# Patient Record
Sex: FEMALE | Race: BLACK OR AFRICAN AMERICAN | URBAN - METROPOLITAN AREA
[De-identification: names, ages, dates, MRNs, and addresses within clinical notes are randomized per-mention and may not be internally consistent; named-entity substitution may affect disease eponyms.]

---

## 2024-09-19 ENCOUNTER — NURSE TRIAGE (OUTPATIENT)
Dept: NURSING | Facility: CLINIC | Age: 24
End: 2024-09-19

## 2024-09-19 NOTE — TELEPHONE ENCOUNTER
"Patient calling reports recently finding out she's pregnant with nausea and vomiting causing some abdominal cramping. Reports vomiting started yesterday and has been mild. Patient also reports possibly having Covid and wants home care advice. Advised medications in early pregnancy are typically not recommended, to maintain hydration and follow up with her PCP tomorrow if she is unable to keep fluids down. Denies vomiting blood, severe vomiting and dehydration.     Nuzhat Self RN 09/19/24 5:38 PM    Health Triage Nurse Advisor    Reason for Disposition   MILD-MODERATE vomiting (e.g., 1-5 times / day) or nausea    Additional Information   Negative: [1] Vomiting AND [2] contains red blood or black (\"coffee ground\") material  (Exception: Few red streaks in vomit that only happened once.)   Negative: [1] Insulin-dependent diabetes (Type I) AND [2] glucose > 400 mg/dl (22 mmol/l)   Negative: Recent head injury (within last 3 days)   Negative: Recent abdominal injury (within last 3 days)   Negative: Severe pain in one eye   Negative: [1] SEVERE vomiting (e.g., 6 or more times / day) AND [2] present > 8 hours   Negative: [1] Drinking very little AND [2] dehydration suspected (e.g., no urine > 12 hours, very dry mouth, very lightheaded)   Negative: Patient sounds very sick or weak to the triager   Negative: [1] Unable to keep ANY liquids down (without vomiting) AND [2] present > 24 hours   Negative: Weight loss > 5 pounds (2.5 kg) in last 2 weeks   Negative: Vomiting an essential or prescribed medication  (Exception: Taking prenatal vitamins.)   Negative: Recently started on a new medication   Negative: [1] MODERATE vomiting (e.g., 3 - 5 times / day) AND [2] present > 3 days   Negative: Pain or burning with passing urine (urination)   Negative: Substance use (drug use) or unhealthy alcohol use, known or suspected   Negative: High-risk adult (e.g., diabetes mellitus, AIDS/HIV)   Negative: [1] MILD vomiting (e.g., 1 - 2 " times / day) AND [2] present > 3 days AND [3] started after the 9th week of pregnancy   Negative: [1] MILD vomiting AND [2] present > 1 week AND [3] no improvement after using Morning Sickness Care Advice    Protocols used: Pregnancy - Morning Sickness (Nausea and Vomiting of Pregnancy)-A-

## 2025-01-21 ENCOUNTER — TRANSFERRED RECORDS (OUTPATIENT)
Dept: HEALTH INFORMATION MANAGEMENT | Facility: CLINIC | Age: 25
End: 2025-01-21

## 2025-04-02 ENCOUNTER — HOSPITAL ENCOUNTER (INPATIENT)
Facility: CLINIC | Age: 25
End: 2025-04-02
Attending: OBSTETRICS & GYNECOLOGY | Admitting: OBSTETRICS & GYNECOLOGY

## 2025-04-02 ENCOUNTER — TRANSFERRED RECORDS (OUTPATIENT)
Dept: OBGYN | Facility: CLINIC | Age: 25
End: 2025-04-02

## 2025-04-02 DIAGNOSIS — D64.9 ACUTE ON CHRONIC ANEMIA: ICD-10-CM

## 2025-04-02 PROBLEM — O09.90 SUPERVISION OF HIGH-RISK PREGNANCY: Status: ACTIVE | Noted: 2025-04-02

## 2025-04-02 LAB
ABO + RH BLD: NORMAL
ALBUMIN UR-MCNC: NEGATIVE MG/DL
APPEARANCE UR: CLEAR
BASOPHILS # BLD AUTO: 0 10E3/UL (ref 0–0.2)
BASOPHILS NFR BLD AUTO: 0 %
BILIRUB UR QL STRIP: NEGATIVE
BLD GP AB SCN SERPL QL: NEGATIVE
BLD PROD TYP BPU: NORMAL
BLD PROD TYP BPU: NORMAL
BLOOD COMPONENT TYPE: NORMAL
BLOOD COMPONENT TYPE: NORMAL
C TRACH DNA SPEC QL PROBE+SIG AMP: NEGATIVE
CLUE CELLS: NORMAL
CODING SYSTEM: NORMAL
CODING SYSTEM: NORMAL
COLOR UR AUTO: ABNORMAL
CROSSMATCH: NORMAL
CROSSMATCH: NORMAL
EOSINOPHIL # BLD AUTO: 0 10E3/UL (ref 0–0.7)
EOSINOPHIL NFR BLD AUTO: 0 %
ERYTHROCYTE [DISTWIDTH] IN BLOOD BY AUTOMATED COUNT: 15.2 % (ref 10–15)
GLUCOSE UR STRIP-MCNC: NEGATIVE MG/DL
HCT VFR BLD AUTO: 26.4 % (ref 35–47)
HGB BLD-MCNC: 8.1 G/DL (ref 11.7–15.7)
HGB UR QL STRIP: NEGATIVE
HOLD SPECIMEN: NORMAL
IMM GRANULOCYTES # BLD: 0.1 10E3/UL
IMM GRANULOCYTES NFR BLD: 1 %
ISSUE DATE AND TIME: NORMAL
ISSUE DATE AND TIME: NORMAL
KETONES UR STRIP-MCNC: NEGATIVE MG/DL
LEUKOCYTE ESTERASE UR QL STRIP: NEGATIVE
LYMPHOCYTES # BLD AUTO: 1 10E3/UL (ref 0.8–5.3)
LYMPHOCYTES NFR BLD AUTO: 12 %
MCH RBC QN AUTO: 23.1 PG (ref 26.5–33)
MCHC RBC AUTO-ENTMCNC: 30.7 G/DL (ref 31.5–36.5)
MCV RBC AUTO: 75 FL (ref 78–100)
MONOCYTES # BLD AUTO: 0.2 10E3/UL (ref 0–1.3)
MONOCYTES NFR BLD AUTO: 3 %
MUCOUS THREADS #/AREA URNS LPF: PRESENT /LPF
N GONORRHOEA DNA SPEC QL NAA+PROBE: NEGATIVE
NEUTROPHILS # BLD AUTO: 7.4 10E3/UL (ref 1.6–8.3)
NEUTROPHILS NFR BLD AUTO: 85 %
NITRATE UR QL: NEGATIVE
NRBC # BLD AUTO: 0 10E3/UL
NRBC BLD AUTO-RTO: 0 /100
PH UR STRIP: 6.5 [PH] (ref 5–7)
PLATELET # BLD AUTO: 159 10E3/UL (ref 150–450)
RBC # BLD AUTO: 3.51 10E6/UL (ref 3.8–5.2)
RBC URINE: 0 /HPF
SP GR UR STRIP: 1.01 (ref 1–1.03)
SPECIMEN EXP DATE BLD: NORMAL
SPECIMEN TYPE: NORMAL
SQUAMOUS EPITHELIAL: 2 /HPF
TRICHOMONAS, WET PREP: NORMAL
UNIT ABO/RH: NORMAL
UNIT ABO/RH: NORMAL
UNIT NUMBER: NORMAL
UNIT NUMBER: NORMAL
UNIT STATUS: NORMAL
UNIT STATUS: NORMAL
UNIT TYPE ISBT: 6200
UNIT TYPE ISBT: 6200
UROBILINOGEN UR STRIP-MCNC: NORMAL MG/DL
WBC # BLD AUTO: 8.8 10E3/UL (ref 4–11)
WBC URINE: 1 /HPF
WBC'S/HIGH POWER FIELD, WET PREP: NORMAL
YEAST, WET PREP: NORMAL

## 2025-04-02 PROCEDURE — 86923 COMPATIBILITY TEST ELECTRIC: CPT

## 2025-04-02 PROCEDURE — 81003 URINALYSIS AUTO W/O SCOPE: CPT

## 2025-04-02 PROCEDURE — 250N000013 HC RX MED GY IP 250 OP 250 PS 637

## 2025-04-02 PROCEDURE — 87210 SMEAR WET MOUNT SALINE/INK: CPT

## 2025-04-02 PROCEDURE — 59025 FETAL NON-STRESS TEST: CPT | Mod: 26 | Performed by: OBSTETRICS & GYNECOLOGY

## 2025-04-02 PROCEDURE — 36415 COLL VENOUS BLD VENIPUNCTURE: CPT

## 2025-04-02 PROCEDURE — 86900 BLOOD TYPING SEROLOGIC ABO: CPT

## 2025-04-02 PROCEDURE — 120N000002 HC R&B MED SURG/OB UMMC

## 2025-04-02 PROCEDURE — 258N000003 HC RX IP 258 OP 636

## 2025-04-02 PROCEDURE — 87653 STREP B DNA AMP PROBE: CPT

## 2025-04-02 PROCEDURE — 99222 1ST HOSP IP/OBS MODERATE 55: CPT | Mod: 25 | Performed by: OBSTETRICS & GYNECOLOGY

## 2025-04-02 PROCEDURE — 87491 CHLMYD TRACH DNA AMP PROBE: CPT

## 2025-04-02 PROCEDURE — 250N000011 HC RX IP 250 OP 636

## 2025-04-02 PROCEDURE — 85004 AUTOMATED DIFF WBC COUNT: CPT

## 2025-04-02 RX ORDER — SODIUM PHOSPHATE,MONO-DIBASIC 19G-7G/118
1 ENEMA (ML) RECTAL DAILY PRN
Status: ACTIVE | OUTPATIENT
Start: 2025-04-02

## 2025-04-02 RX ORDER — METOCLOPRAMIDE 10 MG/1
10 TABLET ORAL EVERY 6 HOURS PRN
Status: ACTIVE | OUTPATIENT
Start: 2025-04-02

## 2025-04-02 RX ORDER — CALCIUM CARBONATE 500 MG/1
1000 TABLET, CHEWABLE ORAL EVERY 6 HOURS PRN
Status: ACTIVE | OUTPATIENT
Start: 2025-04-02

## 2025-04-02 RX ORDER — ONDANSETRON 4 MG/1
4 TABLET, ORALLY DISINTEGRATING ORAL EVERY 6 HOURS PRN
Status: DISPENSED | OUTPATIENT
Start: 2025-04-02

## 2025-04-02 RX ORDER — DIPHENHYDRAMINE HYDROCHLORIDE 50 MG/ML
25 INJECTION, SOLUTION INTRAMUSCULAR; INTRAVENOUS EVERY 6 HOURS PRN
Status: DISCONTINUED | OUTPATIENT
Start: 2025-04-02 | End: 2025-04-02

## 2025-04-02 RX ORDER — ALBUTEROL SULFATE 0.83 MG/ML
2.5 SOLUTION RESPIRATORY (INHALATION)
Status: ACTIVE | OUTPATIENT
Start: 2025-04-02

## 2025-04-02 RX ORDER — SIMETHICONE 80 MG
160 TABLET,CHEWABLE ORAL EVERY 6 HOURS PRN
Status: ACTIVE | OUTPATIENT
Start: 2025-04-02

## 2025-04-02 RX ORDER — MORPHINE SULFATE 10 MG/ML
10 INJECTION, SOLUTION INTRAMUSCULAR; INTRAVENOUS
Status: DISPENSED | OUTPATIENT
Start: 2025-04-02

## 2025-04-02 RX ORDER — MAGNESIUM HYDROXIDE/ALUMINUM HYDROXICE/SIMETHICONE 120; 1200; 1200 MG/30ML; MG/30ML; MG/30ML
30 SUSPENSION ORAL 2 TIMES DAILY PRN
Status: ACTIVE | OUTPATIENT
Start: 2025-04-02

## 2025-04-02 RX ORDER — PENICILLIN G POTASSIUM 5000000 [IU]/1
5 INJECTION, POWDER, FOR SOLUTION INTRAMUSCULAR; INTRAVENOUS ONCE
Status: COMPLETED | OUTPATIENT
Start: 2025-04-02 | End: 2025-04-02

## 2025-04-02 RX ORDER — METOCLOPRAMIDE HYDROCHLORIDE 5 MG/ML
10 INJECTION INTRAMUSCULAR; INTRAVENOUS EVERY 6 HOURS PRN
Status: ACTIVE | OUTPATIENT
Start: 2025-04-02

## 2025-04-02 RX ORDER — LIDOCAINE 40 MG/G
CREAM TOPICAL
Status: ACTIVE | OUTPATIENT
Start: 2025-04-02

## 2025-04-02 RX ORDER — HYDROXYZINE HYDROCHLORIDE 50 MG/1
50-100 TABLET, FILM COATED ORAL
Status: DISPENSED | OUTPATIENT
Start: 2025-04-02

## 2025-04-02 RX ORDER — NIFEDIPINE 10 MG/1
20 CAPSULE ORAL EVERY 6 HOURS
Status: DISCONTINUED | OUTPATIENT
Start: 2025-04-02 | End: 2025-04-04

## 2025-04-02 RX ORDER — DEXTROSE, SODIUM CHLORIDE, SODIUM LACTATE, POTASSIUM CHLORIDE, AND CALCIUM CHLORIDE 5; .6; .31; .03; .02 G/100ML; G/100ML; G/100ML; G/100ML; G/100ML
500 INJECTION, SOLUTION INTRAVENOUS ONCE
Status: COMPLETED | OUTPATIENT
Start: 2025-04-02 | End: 2025-04-02

## 2025-04-02 RX ORDER — PRENATAL VIT/IRON FUM/FOLIC AC 27MG-0.8MG
1 TABLET ORAL DAILY
Status: DISPENSED | OUTPATIENT
Start: 2025-04-03

## 2025-04-02 RX ORDER — AMOXICILLIN 250 MG
2 CAPSULE ORAL
Status: ACTIVE | OUTPATIENT
Start: 2025-04-02

## 2025-04-02 RX ORDER — FENTANYL CITRATE 50 UG/ML
100 INJECTION, SOLUTION INTRAMUSCULAR; INTRAVENOUS
Status: DISPENSED | OUTPATIENT
Start: 2025-04-02

## 2025-04-02 RX ORDER — BISACODYL 10 MG
10 SUPPOSITORY, RECTAL RECTAL DAILY PRN
Status: ACTIVE | OUTPATIENT
Start: 2025-04-02

## 2025-04-02 RX ORDER — PENICILLIN G 3000000 [IU]/50ML
3 INJECTION, SOLUTION INTRAVENOUS EVERY 4 HOURS
Status: DISCONTINUED | OUTPATIENT
Start: 2025-04-02 | End: 2025-04-02

## 2025-04-02 RX ORDER — DIPHENHYDRAMINE HCL 25 MG
25 CAPSULE ORAL EVERY 6 HOURS PRN
Status: DISCONTINUED | OUTPATIENT
Start: 2025-04-02 | End: 2025-04-02

## 2025-04-02 RX ORDER — ONDANSETRON 2 MG/ML
4 INJECTION INTRAMUSCULAR; INTRAVENOUS EVERY 6 HOURS PRN
Status: ACTIVE | OUTPATIENT
Start: 2025-04-02

## 2025-04-02 RX ORDER — PROCHLORPERAZINE MALEATE 10 MG
10 TABLET ORAL EVERY 6 HOURS PRN
Status: ACTIVE | OUTPATIENT
Start: 2025-04-02

## 2025-04-02 RX ORDER — AMOXICILLIN 250 MG
1 CAPSULE ORAL
Status: ACTIVE | OUTPATIENT
Start: 2025-04-02

## 2025-04-02 RX ORDER — MEPERIDINE HYDROCHLORIDE 25 MG/ML
25 INJECTION INTRAMUSCULAR; INTRAVENOUS; SUBCUTANEOUS
Status: DISPENSED | OUTPATIENT
Start: 2025-04-02

## 2025-04-02 RX ORDER — NIFEDIPINE 10 MG/1
20 CAPSULE ORAL EVERY 6 HOURS
Status: DISCONTINUED | OUTPATIENT
Start: 2025-04-02 | End: 2025-04-02

## 2025-04-02 RX ORDER — ALBUTEROL SULFATE 90 UG/1
1-2 INHALANT RESPIRATORY (INHALATION)
Status: ACTIVE | OUTPATIENT
Start: 2025-04-02

## 2025-04-02 RX ORDER — BETAMETHASONE SODIUM PHOSPHATE AND BETAMETHASONE ACETATE 3; 3 MG/ML; MG/ML
12 INJECTION, SUSPENSION INTRA-ARTICULAR; INTRALESIONAL; INTRAMUSCULAR; SOFT TISSUE ONCE
Status: COMPLETED | OUTPATIENT
Start: 2025-04-02 | End: 2025-04-02

## 2025-04-02 RX ORDER — POLYETHYLENE GLYCOL 3350 17 G/17G
17 POWDER, FOR SOLUTION ORAL DAILY PRN
Status: ACTIVE | OUTPATIENT
Start: 2025-04-02

## 2025-04-02 RX ORDER — METHYLPREDNISOLONE SODIUM SUCCINATE 40 MG/ML
40 INJECTION INTRAMUSCULAR; INTRAVENOUS
Status: ACTIVE | OUTPATIENT
Start: 2025-04-02

## 2025-04-02 RX ORDER — ACETAMINOPHEN 325 MG/1
650 TABLET ORAL EVERY 4 HOURS PRN
Status: DISPENSED | OUTPATIENT
Start: 2025-04-02

## 2025-04-02 RX ORDER — CYCLOBENZAPRINE HCL 5 MG
10 TABLET ORAL 3 TIMES DAILY PRN
Status: DISCONTINUED | OUTPATIENT
Start: 2025-04-02 | End: 2025-04-03

## 2025-04-02 RX ADMIN — CYCLOBENZAPRINE HYDROCHLORIDE 10 MG: 5 TABLET, FILM COATED ORAL at 13:47

## 2025-04-02 RX ADMIN — PENICILLIN G 3 MILLION UNITS: 3000000 INJECTION, SOLUTION INTRAVENOUS at 18:53

## 2025-04-02 RX ADMIN — BETAMETHASONE ACETATE AND BETAMETHASONE SODIUM PHOSPHATE 12 MG: 3; 3 INJECTION, SUSPENSION INTRA-ARTICULAR; INTRALESIONAL; INTRAMUSCULAR; SOFT TISSUE at 23:46

## 2025-04-02 RX ADMIN — PENICILLIN G POTASSIUM 5 MILLION UNITS: 5000000 POWDER, FOR SOLUTION INTRAMUSCULAR; INTRAPLEURAL; INTRATHECAL; INTRAVENOUS at 04:00

## 2025-04-02 RX ADMIN — FENTANYL CITRATE 100 MCG: 50 INJECTION INTRAMUSCULAR; INTRAVENOUS at 21:59

## 2025-04-02 RX ADMIN — IRON SUCROSE 300 MG: 20 INJECTION, SOLUTION INTRAVENOUS at 23:15

## 2025-04-02 RX ADMIN — PENICILLIN G 3 MILLION UNITS: 3000000 INJECTION, SOLUTION INTRAVENOUS at 08:10

## 2025-04-02 RX ADMIN — SODIUM CHLORIDE, SODIUM LACTATE, POTASSIUM CHLORIDE, CALCIUM CHLORIDE AND DEXTROSE MONOHYDRATE 500 ML: 5; 600; 310; 30; 20 INJECTION, SOLUTION INTRAVENOUS at 04:00

## 2025-04-02 RX ADMIN — SODIUM CHLORIDE, SODIUM LACTATE, POTASSIUM CHLORIDE, AND CALCIUM CHLORIDE 500 ML: .6; .31; .03; .02 INJECTION, SOLUTION INTRAVENOUS at 03:20

## 2025-04-02 RX ADMIN — ACETAMINOPHEN 650 MG: 325 TABLET, FILM COATED ORAL at 13:43

## 2025-04-02 RX ADMIN — FENTANYL CITRATE 100 MCG: 50 INJECTION INTRAMUSCULAR; INTRAVENOUS at 05:53

## 2025-04-02 RX ADMIN — MORPHINE SULFATE 10 MG: 10 INJECTION, SOLUTION INTRAMUSCULAR; INTRAVENOUS at 03:24

## 2025-04-02 RX ADMIN — NIFEDIPINE 20 MG: 10 CAPSULE ORAL at 16:01

## 2025-04-02 RX ADMIN — HYDROXYZINE HYDROCHLORIDE 100 MG: 50 TABLET, FILM COATED ORAL at 03:24

## 2025-04-02 RX ADMIN — NIFEDIPINE 20 MG: 10 CAPSULE ORAL at 03:31

## 2025-04-02 RX ADMIN — FLUOXETINE HYDROCHLORIDE 20 MG: 20 CAPSULE ORAL at 08:17

## 2025-04-02 RX ADMIN — NIFEDIPINE 20 MG: 10 CAPSULE ORAL at 09:05

## 2025-04-02 RX ADMIN — NIFEDIPINE 20 MG: 10 CAPSULE ORAL at 22:14

## 2025-04-02 ASSESSMENT — ACTIVITIES OF DAILY LIVING (ADL)
ADLS_ACUITY_SCORE: 15
ADLS_ACUITY_SCORE: 17
ADLS_ACUITY_SCORE: 16
ADLS_ACUITY_SCORE: 17
ADLS_ACUITY_SCORE: 16
ADLS_ACUITY_SCORE: 17
ADLS_ACUITY_SCORE: 15
ADLS_ACUITY_SCORE: 17
ADLS_ACUITY_SCORE: 41
ADLS_ACUITY_SCORE: 17
ADLS_ACUITY_SCORE: 16
ADLS_ACUITY_SCORE: 17
ADLS_ACUITY_SCORE: 17
ADLS_ACUITY_SCORE: 16

## 2025-04-02 NOTE — PLAN OF CARE
"Charly report feeling some abd discomfort. Rates her pain 3/10 but \"Nothing like when I arrived. 10/10\" at that time. Monitors readjusted. Charly reports feeling a bit dizzy and weak while assisted to BR. Fentanyl was given a couple hours ago. Please see labor record for details of FHT's. Her SO Mahendra, present and supportive. Warm packs given. Questions answered. Encouraged to call with needs.  "

## 2025-04-02 NOTE — PROGRESS NOTES
Brief Interval Progress Note     MD to bedside, notified by RN that patient is feeling more uncomfortable with contractions this afternoon. On bedside evaluation Charly reports that she had some reprieve from ctx earlier this AM, but that they have begun to  again and become more intense over the last ~2 hours. Feels that they are becoming similar in intensity as to when she presented last night/overnight. No new bleeding/spotting or leakage of fluid. Tocometer with 3-4 ctx over 10 minutes, FHT with moderate variability with HR 130s. Offered Tylenol/flexiril now and PO hydration to help with pain control. Defer cervical check at this time as patient has had multiple examination over the last 18 hours with contractions of similar magnitude/frequency without making cervical change; should ctx persist over the next hour, or if they become more intense, will plan for repeat cervical examination to again evaluate for  labor. In interim will continue with tocometry and TID NSTs, could also consider IV fluid bolus if ctx persist.     Rakan Martinez MD   Obstetrics & Gynecology, PGY-2  2025 2:00 PM

## 2025-04-02 NOTE — PLAN OF CARE
Patient uncomfortable and reported having regular painful contractions every 2-5 minutes shortly after admission. Vitals and assessment WNL. Denies and vaginal bleeding or leaking. Denies SOB, chest pain, vision changes, or headache. SVE at 0228 0/0/-3. Penicillin x1 at 0400 due to GBS unknown status. LR Fluid bolus administered at 0320 due to FHR minimal variability. Dextrose in LR fluid bolus at 0400. Morphine and hydroxyzine at 0324 for contraction pain and sleep. Patient reported after an hour after that morphine did not help with pain. Fentanyl at 0553 for pain. Patient stated shortly after, that fentanyl helped decrease the painful contractions. Patient additionally reported that contractions frequency slowed down. Patient educated on admission, medications administered, and importance of reporting worsening symptoms. Plan for provider is to further observe patient and management of  contractions. Rest and hydration encouraged. Report given to ROSEANNA Aranda at 0720.

## 2025-04-02 NOTE — DISCHARGE SUMMARY
[facility]  Discharge Summary    Charly Kee MRN# [id number]   Age: 25 year old YOB: 2000     Date of Admission:  2025  Date of Discharge::  ***  Admitting Physician:  Jasper Cramer MD  Discharge Physician:  ***        Admission Diagnosis:   Intrauterine pregnancy at 33w6d   contractions  History of  x2  History of  x2  Hepatitis B nonimmune  IUD in place  Depression/anxiety  Obesity  Anemia  Thickened nuchal fold on outside US w/ low risk NIPT         Discharge Diagnosis:   Intrauterine pregnancy at ***w***d  ***       Procedures:   ***       Medications Prior to Admission:     Medications Prior to Admission   Medication Sig Dispense Refill Last Dose/Taking    BENADRYL 12.5 MG/5ML OR ELIX 5 ml po q 6hrs prn 1 bottle 1 year     CHEWABLE MULTIVITAMIN OR CHEW 1 po daily 1 bottle 1 year     HYDROCORTISONE 2.5 % EX CREA apply to affected area bid 30 g 3     PATANOL 0.1 % OP SOLN 1 drop to both eyes twice a day as needed 1 Bottle 3         Discharge Medications:        Review of your medicines        UNREVIEWED medicines. Ask your doctor about these medicines        Dose / Directions   BENADRYL 12.5 MG/5ML elixir  Generic drug: diphenhydrAMINE      5 ml po q 6hrs prn  Quantity: 1 bottle  Refills: 1 year     CHEWABLE MULTIVITAMIN Chew      1 po daily  Quantity: 1 bottle  Refills: 1 year     hydrocortisone 2.5 % cream  Used for: Routine infant or child health check      apply to affected area bid  Quantity: 30 g  Refills: 3     Patanol 0.1 % ophthalmic solution  Used for: Routine infant or child health check  Generic drug: olopatadine      1 drop to both eyes twice a day as needed  Quantity: 1 Bottle  Refills: 3               Consultations:   {OBGYNCONSULTTEAMS:452552}  ***        Hospital Course:   Admission History & Antepartum  25 year old  at 33w6d by 7w1d US who presented as a transfer from [place] for   contractions. Pregnancy complicated by IUD in place, history of  x2, history of  x2, anemia, obesity, hepatitis B nonimmune, and anxiety/depression. Received 1 dose of BMZ, ampicillin, nifedipine prior to transfer. Cervix remains closed on exam here, though is uncomfortable with regular contractions. Will admit for further observation and management of her  contractions.    ***    Prior to discharge, she was voiding without difficulty, tolerating a regular diet without nausea and vomiting, and her pain was well controlled on oral pain medicines.    Intrapartum  ***    // CS  The procedure was uncomplicated***.  QBL *** mL.      Findings:  ***    See operative report for full details.     Postpartum  //   The patient's postpartum course was ***unremarkable. She recovered as anticipated and experienced no complications.*** On discharge, her pain was well controlled. Vaginal bleeding was less than or similar to peak menstrual flow. She was spontaneously voiding without difficulty, ambulating well without dizziness, tolerating a normal diet without nausea or vomiting, and passing flatus. She was ***feeding. She desired {OBPPCONTRACEPTION (Optional):244020} for birth control. Rhogam was ***not indicated. She was discharged on postpartum day #***.    // CS  The patient's postoperative course was ***unremarkable.  She recovered as anticipated and experienced no post-operative complications.*** On discharge, her pain was well controlled. Vaginal bleeding was less than or similar to peak menstrual flow. She was spontaneously voiding without difficulty, ambulating well without dizziness, tolerating a normal diet without nausea or vomiting, and passing flatus. She was afebrile with a well-appearing incision. She was ***feeding. She desired {OBPPCONTRACEPTION (Optional):264510} for birth control. Rhogam was ***not indicated. She was discharged on postpartum day #3***.    Postpartum hemoglobin:    Hemoglobin   Date Value Ref Range Status   2025 8.1 (L) 11.7 - 15.7 g/dL Final   2013 12.5 11.7 - 15.7 g/dL Final           Discharge Instructions and Follow-Up:   // ANTE  Discharge diet: Regular   Discharge activity: Resume prior to admission activity as tolerated. Call for temperature greater than 100.4 F, vaginal bleeding, loss of fluid, contractions, decreased fetal movement, or worsening abdominal pain.    Discharge follow-up: Follow up with ***primary OB in 1-2 weeks      //   Discharge diet: Regular   Discharge activity: Pelvic rest for 6 weeks including no sexual intercourse, tampons, or douching.   Call for temperature greater than 100.4 F, heavy vaginal bleeding, worsening abdominal pain, inability to void, nausea or vomiting, or foul smelling vaginal discharge.   Discharge follow-up: ***1 week mood check  ***1 week BP check  6 week routine postpartum visit     // CS  Discharge diet: Regular   Discharge activity: No lifting greater than 15 lbs, pushing, pulling, or other strenuous activity for 6 weeks. Pelvic rest for 6 weeks including no sexual intercourse, tampons, or douching. No driving while taking narcotic pain medications and until able to slam on the breaks without pain.   Call for temperature greater than 100.4 F, heavy vaginal bleeding, worsening abdominal pain, inability to avoid, nausea or vomiting, or foul smelling vaginal discharge.   Discharge follow-up: ***1 week mood check  ***1 week BP check  ***2 week incision check ([facility] only)  6 week routine postpartum visit with primary OB   Wound care: Keep incision clean and dry. No soaking in bath tub or vigorous scrubbing of incision site.        Discharge Disposition:   Discharge to home in stable condition    ***{refresh, change date/time, sign}

## 2025-04-02 NOTE — PLAN OF CARE
1200 PCN due. Out of stock on the floor. Refill requested. Once received, IV flushed but infiltrated once PCN was running. New IV needed and started in RFA without difficulty. PCN restarted. US/toco placed. Dr Martinez at bedside for evaluation. Tylenol and flexeril also given for discomfort. Warm packs used as well for comfort. Charly currently eating lunch. SO, Mahendra at the bedside and supportive. Encouraged to call with needs.

## 2025-04-02 NOTE — PLAN OF CARE
Data: Patient admitted to room 0481 at 0136. Patient is a . Prenatal record reviewed.   OB History    Para Term  AB Living   6 4 4 0 1 4   SAB IAB Ectopic Multiple Live Births   1 0 0 0 4      # Outcome Date GA Lbr Juan/2nd Weight Sex Type Anes PTL Lv   6 Current            5 SAB      SAB      4 Term         [place]   3 Term         [place]   2 Term      CS-Unspec   [place]   1 Term      CS-Unspec   [place]      Complications: Fetal Intolerance   .  Medical History:   Past Medical History:   Diagnosis Date    NO ACTIVE PROBLEMS      Pregnancy has been complicated by:   - IUD in place   - h/o  x2   -  x2   - Hepatitis B nonimmune  - Obesity  - Anxiety/depression, PTA fluoxetine, PRN hydroxyzine  - Anemia  - Thickened nuchal fold on outside US w/ low risk NIPT    Gestational age 33w6d. Vital signs per doc flowsheet. Fetal movement present. Patient reports painful contractions  as reason for admission. Significant other present at bedside.  Action: Verbal consent for EFM, external fetal monitors applied. Admission assessment completed. Patient and support persons educated on labor process. Patient instructed to report change in fetal movement, contractions, vaginal leaking of fluid or bleeding, abdominal pain, or any concerns related to the pregnancy to her nurse/physician. Patient oriented to room, call light in reach.   Response: Dr. Keller informed of patient arrival. Plan per provider is to rule out pre-term labor, continuous fetal monitoring, and reassess patient as needed. Patient verbalized understanding of education and verbalized agreement with plan. Patient coping with labor via breathing and relaxation.

## 2025-04-02 NOTE — H&P
[facility]  OB History and Physical   2025  Charly Kee  [id number]    HPI: Charly Kee is a 25 year old  at 33w6d by 7w1d US who presents as a transfer of care from [place] due to  contractions.  Patient states that she had had Douglas Lombardo contractions earlier today, however contractions increased in intensity at around 6 PM and were occurring approximately every 10 minutes initially.  Since that time, her contractions have continued to increase in intensity and are now occurring every 2 to 4 minutes. She presented to [place] and was transferred here due to gestational age.     Reports good fetal movement, no LOF, no bleeding. Not currently having chest pain, shortness of breath, headache, vision changes. No history of HTN or asthma.     Of note, this was an unplanned pregnancy. Patient reported wanting a tubal after her prior delivery, however was told she could not have this done due to her age, and had ParaGard IUD placed instead. Attempted removal of IUD was performed during this pregnancy, but the IUD strings broke so it is still in place.     Pregnancy has been complicated by:   - IUD in place   - h/o  x2   -  x2   - Hepatitis B nonimmune  - Obesity  - Anxiety/depression, PTA fluoxetine, PRN hydroxyzine  - Anemia  - Thickened nuchal fold on outside US w/ low risk NIPT    OB History   First  for NRFHT  Second  was repeat after presenting in labor with a provider not comfortable managing a TOLAC  OB History    Para Term  AB Living   6 4 4 0 1 4   SAB IAB Ectopic Multiple Live Births   1 0 0 0 4      # Outcome Date GA Lbr Juan/2nd Weight Sex Type Anes PTL Lv   6 Current            5 SAB      SAB      4 Term         [place]   3 Term         [place]   2 Term      CS-Unspec   [place]   1 Term      CS-Unspec   [place]      Complications: Fetal Intolerance     Past Medical History  Past Medical  History:   Diagnosis Date    NO ACTIVE PROBLEMS      Past Surgical History  History reviewed. No pertinent surgical history.    Medications   Prior to Admission medications    Medication Sig Last Dose Taking? Auth Provider Long Term End Date   BENADRYL 12.5 MG/5ML OR ELIX 5 ml po q 6hrs prn   Abdoul Ayala MD     CHEWABLE MULTIVITAMIN OR CHEW 1 po daily   Abdoul Ayala MD     HYDROCORTISONE 2.5 % EX CREA apply to affected area bid   Sandeep Barr MD     PATANOL 0.1 % OP SOLN 1 drop to both eyes twice a day as needed   Sandeep Barr MD       Allergies  Allergies   Allergen Reactions    Benadryl [Diphenhydramine] Shortness Of Breath and Rash    Latex Rash     Family History  Family History   Problem Relation Age of Onset    Diabetes Maternal Grandfather     Diabetes Paternal Grandfather     Eye Disorder Maternal Grandmother         CATARACT    Eye Disorder Maternal Grandfather         CATARACT/GLAUCOMA    Eye Disorder Paternal Grandmother         CATARACT/ OCULAR HYPERETENSION    Eye Disorder Paternal Grandfather         CATARACT    Hypertension Maternal Grandmother     Hypertension Maternal Grandfather     Hypertension Paternal Grandmother     Hypertension Paternal Grandfather      Social History   No substance use during pregnancy    Physical Exam  Vitals:    04/02/25 0204 04/02/25 0307   BP: 113/65    Resp: 16    Temp: 97.9  F (36.6  C)    TempSrc: Oral    Weight:  98.4 kg (217 lb)     General: alert, oriented female, resting in bed in NAD; intermittently uncomfortable with contractions  CV: regular rate and rhythm  Lungs: normal rate and work of breathing on room air  Abdomen: soft, gravid, non-tender to palpation    Cervix: 0 / 0% / -3  Membranes: intact  Presentation: cephalic by bedside US    FHT: baseline 125 bpm, moderate variability, accelerations present, decelerations absent  [place]: 2 contractions per 10 minutes    Prenatal Labs:  Rh positive, antibody negative  Rubella immune, Varicella immune,  Hepatitis B nonimmune   Hepatitis B sAg NR, Hepatitis C NR, HIV NR, RPR NR  GC/Chlam negative  Genetic screening: unknown  , Hgb A1c 5.1%  GBS unknown    Imaging:   MFM US Comprehensive Single (1/21/25):  IMPRESSION  ---------------------------------------------------------------------------------------------------------  1. [place] intrauterine pregnancy at 23 5/7 weeks gestational age. PIPPA was changed to 5/15/2025 by 7 1/7 week US 6 days discordant with LMP dates.  2. No fetal anomalies commonly detected by ultrasound were identified in the detailed fetal anatomic survey within the limits of prenatal ultrasound.  3. Growth parameters and estimated fetal weight were consistent with established dating.  4. The amniotic fluid volume appeared normal.  5. On transabdominal imaging the cervix appears long and closed.  6. IUD visualized in upper third of cervical canal.    Labs:  Recent Results (from the past 24 hours)   CBC with platelets and differential    Collection Time: 04/02/25  2:24 AM   Result Value Ref Range    WBC Count 8.8 4.0 - 11.0 10e3/uL    RBC Count 3.51 (L) 3.80 - 5.20 10e6/uL    Hemoglobin 8.1 (L) 11.7 - 15.7 g/dL    Hematocrit 26.4 (L) 35.0 - 47.0 %    MCV 75 (L) 78 - 100 fL    MCH 23.1 (L) 26.5 - 33.0 pg    MCHC 30.7 (L) 31.5 - 36.5 g/dL    RDW 15.2 (H) 10.0 - 15.0 %    Platelet Count 159 150 - 450 10e3/uL    % Neutrophils 85 %    % Lymphocytes 12 %    % Monocytes 3 %    % Eosinophils 0 %    % Basophils 0 %    % Immature Granulocytes 1 %    NRBCs per 100 WBC 0 <1 /100    Absolute Neutrophils 7.4 1.6 - 8.3 10e3/uL    Absolute Lymphocytes 1.0 0.8 - 5.3 10e3/uL    Absolute Monocytes 0.2 0.0 - 1.3 10e3/uL    Absolute Eosinophils 0.0 0.0 - 0.7 10e3/uL    Absolute Basophils 0.0 0.0 - 0.2 10e3/uL    Absolute Immature Granulocytes 0.1 <=0.4 10e3/uL    Absolute NRBCs 0.0 10e3/uL   Adult Type and Screen    Collection Time: 04/02/25  2:24 AM   Result Value Ref Range    ABO/RH(D) A POS     Antibody  Screen Negative Negative    SPECIMEN EXPIRATION DATE [id number]    Prepare red blood cells (unit)    Collection Time: 25  2:49 AM   Result Value Ref Range    Blood Component Type Red Blood Cells     Product Code Z2925M10     Unit Status Ready for issue     Unit Number [id number]     CROSSMATCH Compatible     CODING SYSTEM [id number]    Prepare red blood cells (unit)    Collection Time: 25  2:49 AM   Result Value Ref Range    Blood Component Type Red Blood Cells     Product Code B3833I41     Unit Status Ready for issue     Unit Number [id number]     CROSSMATCH Compatible     CODING SYSTEM [id number]      A/P: 25 year old  at 33w6d wby 7w1d US who presents as a transfer from [place] for  contractions. Pregnancy complicated by IUD in place. Has had 2 LTCS and 2 VBACs. Received 1 dose of BMZ, ampicillin, nifedipine prior to transfer. Cervix remains closed on exam here, though is uncomfortable with regular contractions.    Pregnancy has been complicated by:   - IUD in place   - H/o  x2   -  x2   - Hepatitis B nonimmune  - Obesity  - Anxiety/depression, PTA fluoxetine, PRN hydroxyzine  - Anemia  - Thickened nuchal fold on outside US w/ low risk NIPT    #  Contactions  # Rule Out  Labor  - Cervix: 0/0/-4  - Wet prep, GC/CT, UA, GBS pending  - Oral hydration  - Tocolysis: nifedipine  - Mode of delivery: TOLAC   - BMZ: s/p 1 dose BMZ 2335 25, will give second dose after 24h  - Magnesium: not indicated   - Antibiotics: penicillin for GBS unknown status    # TOLAC  # History of  x2  History of  x2,  x2. Previous operative note reviewed with no intraabdominal adhesions noted. Prior incision was transverse. Patient has had appropriate counseling by her primary OB and elects to proceed with TOLAC. Patient is agreeable to repeat CS if medically indicated.   - will sign TOLAC consent form    # Satisfied Parity  Patient is considering tubal  ligation for sterilization. She has public insurance. Reviewed recommendation to sign federal tubal papers given the time that must elapse prior to possible surgery. Patient is going to discuss with her partner.  - sign FTP if patient interested and amenable    # Anemia  Will type & cross for 2U pRBCs given Hgb 8.1    # Anxiety/depression  - Continue PTA fluoxetine, PRN hydroxyzine    # Hepatitis B nonimmune  Unable to see immunization records on outside chart  - Will offer vaccine series    # Prenatal Care  - Rh +, Rubella immune, GBS unknown    # Fetal Well Being  Category I FHT, reactive.  - Continuous fetal monitoring  - Intrauterine resuscitative measures PRN    Disposition: admit to Labor & Delivery    Patient staffed with Dr. Cramer.    Genesis Broussard, MS3   [employer]     I, Moira Jalloh MD, saw the patient with the student and performed, or re-performed, the physical exam and medical decision-making and have verified the accuracy of all documentation and edited as necessary.     Moira Jalloh MD  Obstetrics & Gynecology, PGY-3  04/02/2025 3:35 AM

## 2025-04-03 ENCOUNTER — APPOINTMENT (OUTPATIENT)
Dept: ULTRASOUND IMAGING | Facility: CLINIC | Age: 25
End: 2025-04-03

## 2025-04-03 VITALS
TEMPERATURE: 98.2 F | DIASTOLIC BLOOD PRESSURE: 59 MMHG | WEIGHT: 217 LBS | RESPIRATION RATE: 16 BRPM | SYSTOLIC BLOOD PRESSURE: 107 MMHG

## 2025-04-03 LAB — GP B STREP DNA SPEC QL NAA+PROBE: NEGATIVE

## 2025-04-03 PROCEDURE — 250N000011 HC RX IP 250 OP 636: Mod: JZ

## 2025-04-03 PROCEDURE — 76819 FETAL BIOPHYS PROFIL W/O NST: CPT | Mod: 26 | Performed by: OBSTETRICS & GYNECOLOGY

## 2025-04-03 PROCEDURE — 120N000002 HC R&B MED SURG/OB UMMC

## 2025-04-03 PROCEDURE — 250N000013 HC RX MED GY IP 250 OP 250 PS 637

## 2025-04-03 PROCEDURE — 76819 FETAL BIOPHYS PROFIL W/O NST: CPT

## 2025-04-03 PROCEDURE — 76816 OB US FOLLOW-UP PER FETUS: CPT | Mod: 26 | Performed by: OBSTETRICS & GYNECOLOGY

## 2025-04-03 PROCEDURE — 99232 SBSQ HOSP IP/OBS MODERATE 35: CPT | Mod: 25 | Performed by: OBSTETRICS & GYNECOLOGY

## 2025-04-03 RX ORDER — FENTANYL CITRATE 50 UG/ML
50 INJECTION, SOLUTION INTRAMUSCULAR; INTRAVENOUS ONCE
Status: COMPLETED | OUTPATIENT
Start: 2025-04-03 | End: 2025-04-03

## 2025-04-03 RX ORDER — NALOXONE HYDROCHLORIDE 0.4 MG/ML
0.4 INJECTION, SOLUTION INTRAMUSCULAR; INTRAVENOUS; SUBCUTANEOUS
Status: ACTIVE | OUTPATIENT
Start: 2025-04-03

## 2025-04-03 RX ORDER — FENTANYL CITRATE 50 UG/ML
50 INJECTION, SOLUTION INTRAMUSCULAR; INTRAVENOUS EVERY 6 HOURS PRN
Status: ACTIVE | OUTPATIENT
Start: 2025-04-03

## 2025-04-03 RX ORDER — NALOXONE HYDROCHLORIDE 0.4 MG/ML
0.2 INJECTION, SOLUTION INTRAMUSCULAR; INTRAVENOUS; SUBCUTANEOUS
Status: ACTIVE | OUTPATIENT
Start: 2025-04-03

## 2025-04-03 RX ORDER — CYCLOBENZAPRINE HCL 10 MG
10 TABLET ORAL 3 TIMES DAILY PRN
Status: DISPENSED | OUTPATIENT
Start: 2025-04-03

## 2025-04-03 RX ORDER — HYDROXYZINE HYDROCHLORIDE 50 MG/1
100 TABLET, FILM COATED ORAL ONCE
Status: COMPLETED | OUTPATIENT
Start: 2025-04-03 | End: 2025-04-03

## 2025-04-03 RX ORDER — CYCLOBENZAPRINE HCL 5 MG
10 TABLET ORAL ONCE
Status: COMPLETED | OUTPATIENT
Start: 2025-04-03 | End: 2025-04-03

## 2025-04-03 RX ADMIN — CYCLOBENZAPRINE 10 MG: 10 TABLET, FILM COATED ORAL at 22:19

## 2025-04-03 RX ADMIN — NIFEDIPINE 20 MG: 10 CAPSULE ORAL at 15:26

## 2025-04-03 RX ADMIN — CYCLOBENZAPRINE 10 MG: 10 TABLET, FILM COATED ORAL at 14:58

## 2025-04-03 RX ADMIN — NIFEDIPINE 20 MG: 10 CAPSULE ORAL at 10:06

## 2025-04-03 RX ADMIN — PRENATAL VIT W/ FE FUMARATE-FA TAB 27-0.8 MG 1 TABLET: 27-0.8 TAB at 08:24

## 2025-04-03 RX ADMIN — NIFEDIPINE 20 MG: 10 CAPSULE ORAL at 04:12

## 2025-04-03 RX ADMIN — NIFEDIPINE 20 MG: 10 CAPSULE ORAL at 22:19

## 2025-04-03 RX ADMIN — ONDANSETRON 4 MG: 4 TABLET, ORALLY DISINTEGRATING ORAL at 14:58

## 2025-04-03 RX ADMIN — HYDROXYZINE HYDROCHLORIDE 100 MG: 50 TABLET, FILM COATED ORAL at 00:43

## 2025-04-03 RX ADMIN — CYCLOBENZAPRINE HYDROCHLORIDE 10 MG: 5 TABLET, FILM COATED ORAL at 00:50

## 2025-04-03 RX ADMIN — FENTANYL CITRATE 50 MCG: 50 INJECTION INTRAMUSCULAR; INTRAVENOUS at 00:43

## 2025-04-03 RX ADMIN — FLUOXETINE HYDROCHLORIDE 20 MG: 20 CAPSULE ORAL at 08:24

## 2025-04-03 RX ADMIN — FENTANYL CITRATE 100 MCG: 50 INJECTION INTRAMUSCULAR; INTRAVENOUS at 09:07

## 2025-04-03 RX ADMIN — HYDROXYZINE HYDROCHLORIDE 50 MG: 50 TABLET, FILM COATED ORAL at 22:19

## 2025-04-03 ASSESSMENT — ACTIVITIES OF DAILY LIVING (ADL)
ADLS_ACUITY_SCORE: 16

## 2025-04-03 NOTE — PROGRESS NOTES
MFM Brief Progress Note     S: Notified that patient having ongoing contractions. Fentanyl did not help significantly with pain. Took a bath to help with pain. Previously took flexeril which did help some but open to whatever helps.    O:  /67   Temp 98.1  F (36.7  C) (Oral)   Resp 18   Wt 98.4 kg (217 lb)   LMP 2024 (Exact Date)   Breastfeeding No      General: resting in bed, appears intermittently uncomfortable  VE: /-2    FHT: Baseline 150, moderate variability, accelerations present, no decelerations  TOCO: 2 contractions in ten minutes    A/P: 25 year old  at 34w0d, here for painful  uterine contractions. Has not made significant cervical change but continuing to be uncomfortable. FHT reactive and reassuring. Will try flexeril for pain; if that does not work, consider dilaudid.    Daniel Cook MD  OB/GYN PGY-3  2025 12:01 PM

## 2025-04-03 NOTE — PLAN OF CARE
Goal Outcome Evaluation:      Plan of Care Reviewed With: patient, significant other    Overall Patient Progress: no changeOverall Patient Progress: no change    Data: Contraction pattern  continues per patient report to be every 2-4 minutes, unchanged in intensity . EFM BID unless patient reports change or concerns. Tocolysis: Nifedipine q6h.   Interventions: Continue uterine/fetal assessment twice daily. Activity level:Regular activity, and preventive measures including Medications- Flexeril given. Encourage active range of motion and frequent position changes.  Plan: Continue expectant management. Observe for and notify care provider of indications of progressing labor or signs of fetal/maternal compromise.

## 2025-04-03 NOTE — PLAN OF CARE
Patient VSS and assessment WNL. Patient denies SOB, chest pain, vision changes, headache,  dizziness or any other unordinary s/s. Denies any vaginal leaking or bleeding. Reports active fetal movement. Patient reported discomfort with painful contractions rated an 8/10 at 2130. Received x1 dose of Fentanyl at 2159. Reported pain slightly decreased an hour later. SVE at 2239 was 0.5/20/-3. Received Iron transfusion at 2315. Received second dose of betamethasone at 2346. Reported painful contractions again at 0030. Received second dose of Fentanyl, as well as Flexeril and Hydroxyzine at 0043. Reported painful contractions decreased to 3/10 an hour later. Has not reported painful contractions since. Patient aware to report any new s/s or worsening of previous symptoms. S/O Mahendra at bedside and supportive. Plan per provider is to have patient transfer to Antepartum in the morning. Patient comfortable and resting for remainder of writers shift. Report given to ROSEANNA Mccall at 7600 .

## 2025-04-03 NOTE — PROGRESS NOTES
Maternal-Fetal Medicine Progress Note   Date: 2025   Patient: Eduar Kee [id number]   /Parity:      GA: 34w0d by 7w1d ultrasound   Referred for evaluation and management of  contractions with plan for TOLAC in patient with multiple prior cesareans and IUD in situ.     Subjective:     Patient is a 25-year-old  at 34w0d who remains admitted to L&D for monitoring due to ongoing painful uterine contractions. She reports that contractions are regular and uncomfortable but has not noted any significant worsening in frequency or intensity. She was previously treated with fentanyl for pain, which provided minimal relief. She reports some relief after taking a bath and notes prior improvement with flexeril in similar situations. She remains open to medication options for symptom management. Denies any vaginal bleeding, loss of fluid, leakage, fevers, chills, visual changes, headache, or decreased fetal movement.     Objective:   Patient Vitals for the past 24 hrs:   BP Temp Temp src Resp   25 1005 136/67 -- -- --   25 0825 127/72 -- -- 18   25 0411 124/59 98.1  F (36.7  C) Oral 18   25 0122 120/56 98  F (36.7  C) Oral 18   25 0107 118/59 -- -- --   25 0052 115/60 -- -- --   25 0037 115/62 -- -- --   25 0022 112/63 -- -- --   25 0007 110/67 -- -- 18   25 2100 105/62 97.8  F (36.6  C) Oral 18   25 1601 98/55 98.3  F (36.8  C) Oral 18        Results for orders placed or performed during the hospital encounter of 25   Maternal Fetal US Comprehensive [facility] FU     Status: None    Narrative            Comp Follow Up  ---------------------------------------------------------------------------------------------------------  Pat. Name: EDUAR KEE       Study Date:  2025 7:04am  Pat. NO:  [id number]        Referring  MD: GIUSEPPE ELIAS  Site:         Sonographer: Anni Soto RDMS  :  2000         Age:   25  ---------------------------------------------------------------------------------------------------------    INDICATION  ---------------------------------------------------------------------------------------------------------  INPATIENT.  IUD in place during pregnancy.   contractions.      METHOD  ---------------------------------------------------------------------------------------------------------  Transabdominal ultrasound examination. [facility] ANTEPARTUM inpatient exam. View: Sufficient      PREGNANCY  ---------------------------------------------------------------------------------------------------------  [place] pregnancy. Number of fetuses: 1      DATING  ---------------------------------------------------------------------------------------------------------                                           Date                                Details                                                                                      Gest. age                      PIPPA  LMP                                  2024                                                                                                                           34 w + 6 d                     2025  Previous U/S                      2024                        GA, GA 7 w + 1 d                                                                       34 w + 0 d                     5/15/2025  U/S                                   4/3/2025                          based upon AC, BPD, Femur, HC                                                 34 w + 4 d                     2025  Assigned dating                  based on ultrasound (GA), selected on 2025                                                              34 w + 0 d                     5/15/2025      GENERAL EVALUATION  ---------------------------------------------------------------------------------------------------------  Cardiac activity present. FHR  135 bpm. Fetal movements: present. Presentation: cephalic  Placenta: Anterior, No Previa, > 2 cm from internal os  Umbilical cord: 3 vessel cord  Amniotic fluid: Amount of AF: normal. MVP 5.6 cm      FETAL BIOMETRY  ---------------------------------------------------------------------------------------------------------  BPD                                                         83.5                    mm                         33w 4d                                                [place]  OFD                                                         112.3                  mm                         34w 2d                                                 [place]  HC                                                           311.1                  mm                          34w 6d                                                [place]  Cerebellum tr                                            44.7                    mm                         38w 5d                                                 [place]  AC                                                           314.2                  mm                         35w 2d                      87%                    [place]  Femur                                                      66.8                    mm                         34w 3d                                                 [place]  Fetal Weight Calculation:  EFW                                                        2,530                  g                                                            69%                     [place]  EFW (lb,oz)                                              5 lb 9                  oz  EFW by                                                     [place] (BPD-HC-AC-FL)  Head / Face / Neck Biometry:                                                              5.7                     mm  CM                                                           8.4                      mm      FETAL ANATOMY  ---------------------------------------------------------------------------------------------------------  The following structures appear normal:  Head / Neck                         Cranium. Head size. Head shape. Lateral ventricles. Midline falx. Cavum septi pellucidi. Cerebellum. Cisterna magna. Thalami.  Face                                   Lips. Profile. Nose.  Heart / Thorax                      4-chamber view. RVOT view. LVOT view. 3-vessel-trachea view.                                             Diaphragm.  Abdomen                             Stomach. Kidneys. Bladder.  Spine                                  Cervical spine. Thoracic spine. Lumbar spine. Sacral spine.    Abdomen other: Umbilical vein varix, measuring 10.8 mm. Laminar flow within.    Fetal sex: female.      BIOPHYSICAL PROFILE  ---------------------------------------------------------------------------------------------------------  2: Fetal breathing movements  2: Gross body movements  2: Fetal tone  2: Amniotic fluid volume  8/8 Biophysical profile score      MATERNAL STRUCTURES  ---------------------------------------------------------------------------------------------------------  Cervix                                  Suboptimal  Right Ovary                          Not examined  Left Ovary                            Not examined      RECOMMENDATION  ---------------------------------------------------------------------------------------------------------  Patient is aware of results. Will continue surveillance for development of labor.    Thank you for the opportunity to participate in the care of this patient. If you have questions regarding today's evaluation or if we can be of further service, please contact the  [facility].    **Fetal anomalies may be present but not detected**        Impression     IMPRESSION  ---------------------------------------------------------------------------------------------------------  1. Patient here for a fetal growth scan secondary to a diagnosis of  contractions. She is at 34w0d gestational age.  2. Active single fetus with behavior appropriate for gestational age.  3. Appropriate interval fetal growth.  4. Estimated fetal weight is appropriate for gestational age.  5. None of the anomalies commonly detected by ultrasound were evident in the limited fetal anatomic survey described above.  6. Normal amniotic fluid volume.  7. BPP (performed for maternal contractions) is reassuring.       CBC with platelets and differential     Status: Abnormal   Result Value Ref Range    WBC Count 8.8 4.0 - 11.0 10e3/uL    RBC Count 3.51 (L) 3.80 - 5.20 10e6/uL    Hemoglobin 8.1 (L) 11.7 - 15.7 g/dL    Hematocrit 26.4 (L) 35.0 - 47.0 %    MCV 75 (L) 78 - 100 fL    MCH 23.1 (L) 26.5 - 33.0 pg    MCHC 30.7 (L) 31.5 - 36.5 g/dL    RDW 15.2 (H) 10.0 - 15.0 %    Platelet Count 159 150 - 450 10e3/uL    % Neutrophils 85 %    % Lymphocytes 12 %    % Monocytes 3 %    % Eosinophils 0 %    % Basophils 0 %    % Immature Granulocytes 1 %    NRBCs per 100 WBC 0 <1 /100    Absolute Neutrophils 7.4 1.6 - 8.3 10e3/uL    Absolute Lymphocytes 1.0 0.8 - 5.3 10e3/uL    Absolute Monocytes 0.2 0.0 - 1.3 10e3/uL    Absolute Eosinophils 0.0 0.0 - 0.7 10e3/uL    Absolute Basophils 0.0 0.0 - 0.2 10e3/uL    Absolute Immature Granulocytes 0.1 <=0.4 10e3/uL    Absolute NRBCs 0.0 10e3/uL   Extra Tube     Status: None    Narrative    The following orders were created for panel order Extra Tube.  Procedure                               Abnormality         Status                     ---------                               -----------         ------                     Extra Serum Separator T...[[id number]]                      Final result               Extra Green Top (Vini...[[id number]]                      Final  result                 Please view results for these tests on the individual orders.   Extra Serum Separator Tube (SST)     Status: None   Result Value Ref Range    Hold Specimen JIC    Extra Green Top (Lithium Heparin) Tube     Status: None   Result Value Ref Range    Hold Specimen JIC    Extra Tube     Status: None    Narrative    The following orders were created for panel order Extra Tube.  Procedure                               Abnormality         Status                     ---------                               -----------         ------                     Extra Serum Separator T...[[id number]]                      Final result                 Please view results for these tests on the individual orders.   Extra Serum Separator Tube (SST)     Status: None   Result Value Ref Range    Hold Specimen JIC    UA with Microscopic reflex to Culture     Status: Abnormal    Specimen: Urine, Clean Catch   Result Value Ref Range    Color Urine Light Yellow Colorless, Straw, Light Yellow, Yellow    Appearance Urine Clear Clear    Glucose Urine Negative Negative mg/dL    Bilirubin Urine Negative Negative    Ketones Urine Negative Negative mg/dL    Specific Gravity Urine 1.011 1.003 - 1.035    Blood Urine Negative Negative    pH Urine 6.5 5.0 - 7.0    Protein Albumin Urine Negative Negative mg/dL    Urobilinogen Urine Normal Normal mg/dL    Nitrite Urine Negative Negative    Leukocyte Esterase Urine Negative Negative    Mucus Urine Present (A) None Seen /LPF    RBC Urine 0 <=2 /HPF    WBC Urine 1 <=5 /HPF    Squamous Epithelials Urine 2 (H) <=1 /HPF    Narrative    Urine Culture not indicated   Adult Type and Screen     Status: None   Result Value Ref Range    ABO/RH(D) A POS     Antibody Screen Negative Negative    SPECIMEN EXPIRATION DATE [id number]    Prepare red blood cells (unit)     Status: None (Preliminary result)   Result Value Ref Range    Blood Component Type Red Blood Cells     Product Code L6090Z63      Unit Status Ready for issue     Unit Number [id number]     CROSSMATCH Compatible     CODING SYSTEM [id number]    Prepare red blood cells (unit)     Status: None (Preliminary result)   Result Value Ref Range    Blood Component Type Red Blood Cells     Product Code D7849X99     Unit Status Ready for issue     Unit Number [id number]     CROSSMATCH Compatible     CODING SYSTEM [id number]    Wet preparation     Status: Normal    Specimen: Vagina; Swab   Result Value Ref Range    Trichomonas Absent Absent    Yeast Absent Absent    Clue Cells Absent Absent    WBCs/high power field None None   Chlamydia trachomatis/Neisseria gonorrhoeae by PCR     Status: None    Specimen: Vagina; Swab   Result Value Ref Range    Chlamydia Trachomatis Negative Negative    Neisseria gonorrhoeae Negative Negative    CTNG Specimen Source Vagina    Group B strep PCR     Status: Normal    Specimen: Rectovaginal; Swab   Result Value Ref Range    Group B Strep PCR Negative Negative    Narrative    The [employer] Xpert GBS LB Assay, performed on the [employer], is a qualitative in vitro diagnostic test designed to detect Group B Streptococcus (GBS) DNA from enriched vaginal/rectal swab specimens, using fully automated, real-time polymerase chain reaction (PCR) with fluorogenic detection of the amplified DNA. Xpert GBS LB Assay testing is indicated as an aid in determining GBS colonization status in antepartum women. This assay does not diagnose or monitor treatment for GBS infections. The [employer] Xpert GBS LB Assay is intended for use in hospital, reference or state laboratory settings. The device is not intended for point-of-care use.   CBC with platelets differential     Status: Abnormal    Narrative    The following orders were created for panel order CBC with platelets differential.  Procedure                               Abnormality         Status                     ---------                               -----------          ------                     CBC with platelets and ...[[id number]]  Abnormal            Final result                 Please view results for these tests on the individual orders.   ABO/Rh type and screen     Status: None    Narrative    The following orders were created for panel order ABO/Rh type and screen.  Procedure                               Abnormality         Status                     ---------                               -----------         ------                     Adult Type and Screen[[id number]]                           Final result                 Please view results for these tests on the individual orders.           Vitals: /67, HR 86, RR 18, Temp 98.1 F (oral), SpO2 99% on room air   Weight: 98.4 kg (217 lb)   General: Resting in bed, intermittently uncomfortable with contractions   CV: Regular rate and rhythm, well perfused   Pulm: Breathing comfortably, no increased work of breathing   Abdomen: Soft, gravid, non-tender to palpation, no rebound or guarding   Extremities: Trace bilateral lower extremity edema   Cervical Exam: 1 cm dilated / 40% effaced / -2 station (no change from prior exam)   Membranes: Intact   Presentation: Cephalic by prior ultrasound   FHT: Baseline 150 bpm, moderate variability, accelerations present, no decelerations (Category I)   TOCO: 2 contractions every 10 minutes     Assessment & Plan:   25-year-old  at 34w0d presenting with  contractions. Pregnancy is further complicated by history of 2 prior cesareans, 2 prior VBACs, and retained intrauterine device (IUD). Patient is a candidate for TOLAC and desires trial of labor.     #  Contractions / Rule Out  Labor   - Contractions ongoing, painful, but no cervical change noted on serial exams   - FHT Category I with reassuring tracing   - No evidence of active labor or cervical ripening   - Continue monitoring for changes in contraction pattern or cervical status   - Administer flexeril  for uterine irritability and symptomatic relief   - If ineffective, may escalate to dilaudid for breakthrough pain   - Encourage oral hydration, rest, and comfort measures   - Continue continuous external fetal and uterine monitoring   - Defer tocolytics and magnesium sulfate at this time, as she is >34w0d and not in labor     # Trial of Labor After  (TOLAC)   - Prior history of 2 LTCS followed by 2 successful VBACs   - Prior operative reports reviewed -- no documented contraindications   - IUD noted in situ in upper cervical canal on prior ultrasound; not removed due to broken strings   - Will reconfirm and document TOLAC consent with patient   - Monitor closely for signs of labor or uterine rupture given  and IUD in situ     # Fetal Wellbeing   - Continuous monitoring ongoing with reactive Category I tracing   - No signs of fetal distress   - Maintain intrauterine resuscitative measures PRN     # Anemia   - Hgb 8.1, consistent with known anemia   - Type & cross completed; 2 units PRBCs ready for transfusion if needed   - Monitor for symptoms (fatigue, tachycardia, lightheadedness); transfuse only if symptomatic or drop in Hgb =<7 g/dL    # Hepatitis B Nonimmune   - No record of prior vaccination   - Offer initiation of Hepatitis B vaccine series during hospitalization     # Mental Health - Anxiety/Depression   - Known history of anxiety and depression   - Previously on fluoxetine and PRN hydroxyzine   - Continue supportive care and assess mood daily   - Will coordinate social work/psychiatry consult if needed     # Sterilization Planning   - Patient previously desired tubal ligation after last delivery but was declined due to age   - Interested in postpartum sterilization now   -  regarding timing and requirements of federally mandated tubal sterilization consent (FTP)   - Offer and complete FTP if patient desires and meets eligibility     # IUD in Place   - IUD remains visualized in cervical  canal   - No evidence of migration or perforation   - Will monitor closely; no intervention planned at this time     Plan:   Continue inpatient monitoring on L&D   Reassess cervical exam and fetal status if symptoms escalate   If patient becomes stable and contractions resolve, anticipate transition to outpatient care with close follow-up     Jasper Cramer M.D.  Maternal Fetal Medicine  04/03/25    I personally spent a total of 45 minutes (EXCLUDING ANY ULTRASOUND INTERPRETATION), including both face-to-face and non-face-to-face on the date of the encounter, addressing the above diagnosis. Activities performed in this time include chart review, obtaining / reviewing history, performing a medically necessary evaluation, documentation and Charge activities: counseling, care coordination, ordering tests, and reviewing results, equivalent to medical decision making that is of moderate complexity.

## 2025-04-03 NOTE — PLAN OF CARE
Pt continues to have contractions. After Nifedipine, contractions were a bit less intense. Cervix unchanged x2. Monitors off. FHT reactive. Pt will try to eat some lunch. Pt transferred to room . Report given to Rm Forman RN.

## 2025-04-03 NOTE — PROVIDER NOTIFICATION
04/03/25 0830   Provider Notification   Provider Name/Title    Method of Notification Electronic Page   Request Evaluate in Person   Notification Reason Uterine Activity;Pain;Status Update     Pt having contractions and breathing through them. Feeling the same as yesterdays contractions. Monitors applied.  informed of above information. Will await new orders.

## 2025-04-04 ENCOUNTER — ANESTHESIA (OUTPATIENT)
Dept: OBGYN | Facility: CLINIC | Age: 25
End: 2025-04-04

## 2025-04-04 ENCOUNTER — APPOINTMENT (OUTPATIENT)
Dept: GENERAL RADIOLOGY | Facility: CLINIC | Age: 25
End: 2025-04-04

## 2025-04-04 ENCOUNTER — ANESTHESIA EVENT (OUTPATIENT)
Dept: OBGYN | Facility: CLINIC | Age: 25
End: 2025-04-04

## 2025-04-04 LAB
ABO + RH BLD: NORMAL
ALBUMIN SERPL BCG-MCNC: 3 G/DL (ref 3.5–5.2)
ALP SERPL-CCNC: 128 U/L (ref 40–150)
ALT SERPL W P-5'-P-CCNC: 6 U/L (ref 0–50)
ANION GAP SERPL CALCULATED.3IONS-SCNC: 12 MMOL/L (ref 7–15)
AST SERPL W P-5'-P-CCNC: 11 U/L (ref 0–45)
BILIRUB SERPL-MCNC: 0.2 MG/DL
BLD GP AB SCN SERPL QL: NEGATIVE
BLD PROD TYP BPU: NORMAL
BLD PROD TYP BPU: NORMAL
BLOOD COMPONENT TYPE: NORMAL
BLOOD COMPONENT TYPE: NORMAL
BUN SERPL-MCNC: 6.9 MG/DL (ref 6–20)
CALCIUM SERPL-MCNC: 8.4 MG/DL (ref 8.8–10.4)
CHLORIDE SERPL-SCNC: 110 MMOL/L (ref 98–107)
CODING SYSTEM: NORMAL
CODING SYSTEM: NORMAL
CREAT SERPL-MCNC: 0.59 MG/DL (ref 0.51–0.95)
CROSSMATCH: NORMAL
CROSSMATCH: NORMAL
EGFRCR SERPLBLD CKD-EPI 2021: >90 ML/MIN/1.73M2
ERYTHROCYTE [DISTWIDTH] IN BLOOD BY AUTOMATED COUNT: 15.4 % (ref 10–15)
ERYTHROCYTE [DISTWIDTH] IN BLOOD BY AUTOMATED COUNT: 15.7 % (ref 10–15)
ERYTHROCYTE [DISTWIDTH] IN BLOOD BY AUTOMATED COUNT: 15.8 % (ref 10–15)
FERRITIN SERPL-MCNC: 119 NG/ML (ref 6–175)
GLUCOSE BLDC GLUCOMTR-MCNC: 96 MG/DL (ref 70–99)
GLUCOSE SERPL-MCNC: 75 MG/DL (ref 70–99)
HCO3 SERPL-SCNC: 19 MMOL/L (ref 22–29)
HCT VFR BLD AUTO: 25.3 % (ref 35–47)
HCT VFR BLD AUTO: 28.5 % (ref 35–47)
HCT VFR BLD AUTO: 29.6 % (ref 35–47)
HGB BLD-MCNC: 7.6 G/DL (ref 11.7–15.7)
HGB BLD-MCNC: 8.8 G/DL (ref 11.7–15.7)
HGB BLD-MCNC: 9.2 G/DL (ref 11.7–15.7)
HOLD SPECIMEN: NORMAL
ISSUE DATE AND TIME: NORMAL
ISSUE DATE AND TIME: NORMAL
MCH RBC QN AUTO: 23 PG (ref 26.5–33)
MCH RBC QN AUTO: 23.7 PG (ref 26.5–33)
MCH RBC QN AUTO: 24.1 PG (ref 26.5–33)
MCHC RBC AUTO-ENTMCNC: 30 G/DL (ref 31.5–36.5)
MCHC RBC AUTO-ENTMCNC: 30.9 G/DL (ref 31.5–36.5)
MCHC RBC AUTO-ENTMCNC: 31.1 G/DL (ref 31.5–36.5)
MCV RBC AUTO: 76 FL (ref 78–100)
MCV RBC AUTO: 77 FL (ref 78–100)
MCV RBC AUTO: 78 FL (ref 78–100)
PLATELET # BLD AUTO: 147 10E3/UL (ref 150–450)
PLATELET # BLD AUTO: 150 10E3/UL (ref 150–450)
PLATELET # BLD AUTO: 154 10E3/UL (ref 150–450)
POTASSIUM SERPL-SCNC: 3.7 MMOL/L (ref 3.4–5.3)
PROT SERPL-MCNC: 6.1 G/DL (ref 6.4–8.3)
RBC # BLD AUTO: 3.31 10E6/UL (ref 3.8–5.2)
RBC # BLD AUTO: 3.71 10E6/UL (ref 3.8–5.2)
RBC # BLD AUTO: 3.81 10E6/UL (ref 3.8–5.2)
SODIUM SERPL-SCNC: 141 MMOL/L (ref 135–145)
SPECIMEN EXP DATE BLD: NORMAL
TROPONIN T SERPL HS-MCNC: <6 NG/L
UNIT ABO/RH: NORMAL
UNIT ABO/RH: NORMAL
UNIT NUMBER: NORMAL
UNIT NUMBER: NORMAL
UNIT STATUS: NORMAL
UNIT STATUS: NORMAL
UNIT TYPE ISBT: 6200
UNIT TYPE ISBT: 6200
WBC # BLD AUTO: 11.9 10E3/UL (ref 4–11)
WBC # BLD AUTO: 14.2 10E3/UL (ref 4–11)
WBC # BLD AUTO: 8.2 10E3/UL (ref 4–11)

## 2025-04-04 PROCEDURE — P9016 RBC LEUKOCYTES REDUCED: HCPCS

## 2025-04-04 PROCEDURE — 76815 OB US LIMITED FETUS(S): CPT | Mod: 26 | Performed by: OBSTETRICS & GYNECOLOGY

## 2025-04-04 PROCEDURE — 88307 TISSUE EXAM BY PATHOLOGIST: CPT | Mod: TC

## 2025-04-04 PROCEDURE — 250N000011 HC RX IP 250 OP 636: Mod: JZ

## 2025-04-04 PROCEDURE — 80053 COMPREHEN METABOLIC PANEL: CPT

## 2025-04-04 PROCEDURE — 250N000013 HC RX MED GY IP 250 OP 250 PS 637

## 2025-04-04 PROCEDURE — 88307 TISSUE EXAM BY PATHOLOGIST: CPT | Mod: 26 | Performed by: PATHOLOGY

## 2025-04-04 PROCEDURE — 272N000001 HC OR GENERAL SUPPLY STERILE: Performed by: OBSTETRICS & GYNECOLOGY

## 2025-04-04 PROCEDURE — 250N000013 HC RX MED GY IP 250 OP 250 PS 637: Performed by: OBSTETRICS & GYNECOLOGY

## 2025-04-04 PROCEDURE — 360N000076 HC SURGERY LEVEL 3, PER MIN: Performed by: OBSTETRICS & GYNECOLOGY

## 2025-04-04 PROCEDURE — 82947 ASSAY GLUCOSE BLOOD QUANT: CPT

## 2025-04-04 PROCEDURE — 59514 CESAREAN DELIVERY ONLY: CPT | Performed by: STUDENT IN AN ORGANIZED HEALTH CARE EDUCATION/TRAINING PROGRAM

## 2025-04-04 PROCEDURE — 250N000009 HC RX 250

## 2025-04-04 PROCEDURE — 71045 X-RAY EXAM CHEST 1 VIEW: CPT

## 2025-04-04 PROCEDURE — 258N000003 HC RX IP 258 OP 636

## 2025-04-04 PROCEDURE — 0UPD0HZ REMOVAL OF CONTRACEPTIVE DEVICE FROM UTERUS AND CERVIX, OPEN APPROACH: ICD-10-PCS | Performed by: OBSTETRICS & GYNECOLOGY

## 2025-04-04 PROCEDURE — 36415 COLL VENOUS BLD VENIPUNCTURE: CPT

## 2025-04-04 PROCEDURE — 82728 ASSAY OF FERRITIN: CPT | Performed by: OBSTETRICS & GYNECOLOGY

## 2025-04-04 PROCEDURE — 84484 ASSAY OF TROPONIN QUANT: CPT

## 2025-04-04 PROCEDURE — 93010 ELECTROCARDIOGRAM REPORT: CPT | Performed by: INTERNAL MEDICINE

## 2025-04-04 PROCEDURE — 271N000001 HC OR GENERAL SUPPLY NON-STERILE: Performed by: OBSTETRICS & GYNECOLOGY

## 2025-04-04 PROCEDURE — 250N000011 HC RX IP 250 OP 636

## 2025-04-04 PROCEDURE — 71045 X-RAY EXAM CHEST 1 VIEW: CPT | Mod: 26 | Performed by: STUDENT IN AN ORGANIZED HEALTH CARE EDUCATION/TRAINING PROGRAM

## 2025-04-04 PROCEDURE — 999N000141 HC STATISTIC PRE-PROCEDURE NURSING ASSESSMENT: Performed by: OBSTETRICS & GYNECOLOGY

## 2025-04-04 PROCEDURE — 999N000016 HC STATISTIC ATTENDANCE AT DELIVERY

## 2025-04-04 PROCEDURE — 85018 HEMOGLOBIN: CPT

## 2025-04-04 PROCEDURE — 250N000009 HC RX 250: Performed by: OBSTETRICS & GYNECOLOGY

## 2025-04-04 PROCEDURE — 86900 BLOOD TYPING SEROLOGIC ABO: CPT

## 2025-04-04 PROCEDURE — 250N000011 HC RX IP 250 OP 636: Performed by: STUDENT IN AN ORGANIZED HEALTH CARE EDUCATION/TRAINING PROGRAM

## 2025-04-04 PROCEDURE — 86923 COMPATIBILITY TEST ELECTRIC: CPT

## 2025-04-04 PROCEDURE — 93005 ELECTROCARDIOGRAM TRACING: CPT

## 2025-04-04 PROCEDURE — 710N000010 HC RECOVERY PHASE 1, LEVEL 2, PER MIN: Performed by: OBSTETRICS & GYNECOLOGY

## 2025-04-04 PROCEDURE — 120N000003 HC R&B IMCU UMMC

## 2025-04-04 PROCEDURE — 370N000017 HC ANESTHESIA TECHNICAL FEE, PER MIN: Performed by: OBSTETRICS & GYNECOLOGY

## 2025-04-04 PROCEDURE — 64488 TAP BLOCK BI INJECTION: CPT | Mod: 59 | Performed by: STUDENT IN AN ORGANIZED HEALTH CARE EDUCATION/TRAINING PROGRAM

## 2025-04-04 PROCEDURE — 99232 SBSQ HOSP IP/OBS MODERATE 35: CPT | Mod: 25 | Performed by: OBSTETRICS & GYNECOLOGY

## 2025-04-04 PROCEDURE — 85014 HEMATOCRIT: CPT

## 2025-04-04 RX ORDER — DEXAMETHASONE SODIUM PHOSPHATE 4 MG/ML
4 INJECTION, SOLUTION INTRA-ARTICULAR; INTRALESIONAL; INTRAMUSCULAR; INTRAVENOUS; SOFT TISSUE
Status: DISCONTINUED | OUTPATIENT
Start: 2025-04-04 | End: 2025-04-04 | Stop reason: HOSPADM

## 2025-04-04 RX ORDER — CARBOPROST TROMETHAMINE 250 UG/ML
250 INJECTION, SOLUTION INTRAMUSCULAR
Status: DISCONTINUED | OUTPATIENT
Start: 2025-04-04 | End: 2025-04-05 | Stop reason: HOSPADM

## 2025-04-04 RX ORDER — NALOXONE HYDROCHLORIDE 0.4 MG/ML
0.1 INJECTION, SOLUTION INTRAMUSCULAR; INTRAVENOUS; SUBCUTANEOUS
Status: DISCONTINUED | OUTPATIENT
Start: 2025-04-04 | End: 2025-04-04 | Stop reason: HOSPADM

## 2025-04-04 RX ORDER — BISACODYL 10 MG
10 SUPPOSITORY, RECTAL RECTAL DAILY PRN
Status: DISCONTINUED | OUTPATIENT
Start: 2025-04-06 | End: 2025-04-05 | Stop reason: HOSPADM

## 2025-04-04 RX ORDER — TRANEXAMIC ACID 10 MG/ML
1 INJECTION, SOLUTION INTRAVENOUS EVERY 30 MIN PRN
Status: DISCONTINUED | OUTPATIENT
Start: 2025-04-04 | End: 2025-04-05 | Stop reason: HOSPADM

## 2025-04-04 RX ORDER — TRANEXAMIC ACID 10 MG/ML
1 INJECTION, SOLUTION INTRAVENOUS EVERY 30 MIN PRN
Status: DISCONTINUED | OUTPATIENT
Start: 2025-04-04 | End: 2025-04-04 | Stop reason: HOSPADM

## 2025-04-04 RX ORDER — ACETAMINOPHEN 325 MG/1
975 TABLET ORAL ONCE
Status: DISCONTINUED | OUTPATIENT
Start: 2025-04-04 | End: 2025-04-04 | Stop reason: HOSPADM

## 2025-04-04 RX ORDER — MISOPROSTOL 200 UG/1
800 TABLET ORAL
Status: DISCONTINUED | OUTPATIENT
Start: 2025-04-04 | End: 2025-04-04 | Stop reason: HOSPADM

## 2025-04-04 RX ORDER — ONDANSETRON 4 MG/1
4 TABLET, ORALLY DISINTEGRATING ORAL EVERY 6 HOURS PRN
Status: DISCONTINUED | OUTPATIENT
Start: 2025-04-04 | End: 2025-04-05 | Stop reason: HOSPADM

## 2025-04-04 RX ORDER — OXYTOCIN 10 [USP'U]/ML
10 INJECTION, SOLUTION INTRAMUSCULAR; INTRAVENOUS
Status: DISCONTINUED | OUTPATIENT
Start: 2025-04-04 | End: 2025-04-05 | Stop reason: HOSPADM

## 2025-04-04 RX ORDER — LOPERAMIDE HYDROCHLORIDE 2 MG/1
2 CAPSULE ORAL
Status: DISCONTINUED | OUTPATIENT
Start: 2025-04-04 | End: 2025-04-05 | Stop reason: HOSPADM

## 2025-04-04 RX ORDER — OXYCODONE HYDROCHLORIDE 5 MG/1
5 TABLET ORAL
Status: DISCONTINUED | OUTPATIENT
Start: 2025-04-04 | End: 2025-04-05 | Stop reason: HOSPADM

## 2025-04-04 RX ORDER — METHYLERGONOVINE MALEATE 0.2 MG/ML
200 INJECTION INTRAVENOUS
Status: DISCONTINUED | OUTPATIENT
Start: 2025-04-04 | End: 2025-04-04 | Stop reason: HOSPADM

## 2025-04-04 RX ORDER — CITRIC ACID/SODIUM CITRATE 334-500MG
30 SOLUTION, ORAL ORAL
Status: COMPLETED | OUTPATIENT
Start: 2025-04-04 | End: 2025-04-04

## 2025-04-04 RX ORDER — METOCLOPRAMIDE 10 MG/1
10 TABLET ORAL EVERY 6 HOURS PRN
Status: DISCONTINUED | OUTPATIENT
Start: 2025-04-04 | End: 2025-04-05 | Stop reason: HOSPADM

## 2025-04-04 RX ORDER — PROCHLORPERAZINE MALEATE 10 MG
10 TABLET ORAL EVERY 6 HOURS PRN
Status: DISCONTINUED | OUTPATIENT
Start: 2025-04-04 | End: 2025-04-05 | Stop reason: HOSPADM

## 2025-04-04 RX ORDER — GLYCOPYRROLATE 0.2 MG/ML
INJECTION, SOLUTION INTRAMUSCULAR; INTRAVENOUS PRN
Status: DISCONTINUED | OUTPATIENT
Start: 2025-04-04 | End: 2025-04-04

## 2025-04-04 RX ORDER — METOCLOPRAMIDE HYDROCHLORIDE 5 MG/ML
10 INJECTION INTRAMUSCULAR; INTRAVENOUS EVERY 6 HOURS PRN
Status: DISCONTINUED | OUTPATIENT
Start: 2025-04-04 | End: 2025-04-05 | Stop reason: HOSPADM

## 2025-04-04 RX ORDER — PROPOFOL 10 MG/ML
INJECTION, EMULSION INTRAVENOUS PRN
Status: DISCONTINUED | OUTPATIENT
Start: 2025-04-04 | End: 2025-04-04

## 2025-04-04 RX ORDER — ONDANSETRON 4 MG/1
4 TABLET, ORALLY DISINTEGRATING ORAL EVERY 30 MIN PRN
Status: DISCONTINUED | OUTPATIENT
Start: 2025-04-04 | End: 2025-04-04 | Stop reason: HOSPADM

## 2025-04-04 RX ORDER — KETOROLAC TROMETHAMINE 15 MG/ML
15 INJECTION, SOLUTION INTRAMUSCULAR; INTRAVENOUS EVERY 6 HOURS
Status: COMPLETED | OUTPATIENT
Start: 2025-04-04 | End: 2025-04-05

## 2025-04-04 RX ORDER — KETAMINE HYDROCHLORIDE 10 MG/ML
INJECTION INTRAMUSCULAR; INTRAVENOUS PRN
Status: DISCONTINUED | OUTPATIENT
Start: 2025-04-04 | End: 2025-04-04

## 2025-04-04 RX ORDER — FENTANYL CITRATE 50 UG/ML
INJECTION, SOLUTION INTRAMUSCULAR; INTRAVENOUS PRN
Status: DISCONTINUED | OUTPATIENT
Start: 2025-04-04 | End: 2025-04-04

## 2025-04-04 RX ORDER — OXYCODONE HYDROCHLORIDE 5 MG/1
10 TABLET ORAL
Status: DISCONTINUED | OUTPATIENT
Start: 2025-04-04 | End: 2025-04-04

## 2025-04-04 RX ORDER — ACETAMINOPHEN 325 MG/1
975 TABLET ORAL EVERY 6 HOURS
Status: DISCONTINUED | OUTPATIENT
Start: 2025-04-04 | End: 2025-04-05 | Stop reason: HOSPADM

## 2025-04-04 RX ORDER — AZITHROMYCIN 500 MG/5ML
500 INJECTION, POWDER, LYOPHILIZED, FOR SOLUTION INTRAVENOUS
Status: DISCONTINUED | OUTPATIENT
Start: 2025-04-04 | End: 2025-04-05 | Stop reason: HOSPADM

## 2025-04-04 RX ORDER — SIMETHICONE 80 MG
80 TABLET,CHEWABLE ORAL 4 TIMES DAILY PRN
Status: DISCONTINUED | OUTPATIENT
Start: 2025-04-04 | End: 2025-04-05 | Stop reason: HOSPADM

## 2025-04-04 RX ORDER — NALOXONE HYDROCHLORIDE 0.4 MG/ML
0.1 INJECTION, SOLUTION INTRAMUSCULAR; INTRAVENOUS; SUBCUTANEOUS
Status: DISCONTINUED | OUTPATIENT
Start: 2025-04-04 | End: 2025-04-04

## 2025-04-04 RX ORDER — SODIUM CHLORIDE, SODIUM LACTATE, POTASSIUM CHLORIDE, CALCIUM CHLORIDE 600; 310; 30; 20 MG/100ML; MG/100ML; MG/100ML; MG/100ML
INJECTION, SOLUTION INTRAVENOUS CONTINUOUS
Status: DISCONTINUED | OUTPATIENT
Start: 2025-04-04 | End: 2025-04-04 | Stop reason: HOSPADM

## 2025-04-04 RX ORDER — MISOPROSTOL 200 UG/1
800 TABLET ORAL
Status: DISCONTINUED | OUTPATIENT
Start: 2025-04-04 | End: 2025-04-05 | Stop reason: HOSPADM

## 2025-04-04 RX ORDER — ONDANSETRON 2 MG/ML
4 INJECTION INTRAMUSCULAR; INTRAVENOUS EVERY 6 HOURS PRN
Status: DISCONTINUED | OUTPATIENT
Start: 2025-04-04 | End: 2025-04-05 | Stop reason: HOSPADM

## 2025-04-04 RX ORDER — ONDANSETRON 2 MG/ML
4 INJECTION INTRAMUSCULAR; INTRAVENOUS EVERY 30 MIN PRN
Status: DISCONTINUED | OUTPATIENT
Start: 2025-04-04 | End: 2025-04-04

## 2025-04-04 RX ORDER — MISOPROSTOL 200 UG/1
400 TABLET ORAL
Status: DISCONTINUED | OUTPATIENT
Start: 2025-04-04 | End: 2025-04-04 | Stop reason: HOSPADM

## 2025-04-04 RX ORDER — OXYTOCIN/0.9 % SODIUM CHLORIDE 30/500 ML
340 PLASTIC BAG, INJECTION (ML) INTRAVENOUS CONTINUOUS PRN
Status: DISCONTINUED | OUTPATIENT
Start: 2025-04-04 | End: 2025-04-05 | Stop reason: HOSPADM

## 2025-04-04 RX ORDER — LIDOCAINE 40 MG/G
CREAM TOPICAL
Status: DISCONTINUED | OUTPATIENT
Start: 2025-04-04 | End: 2025-04-04 | Stop reason: HOSPADM

## 2025-04-04 RX ORDER — CEFAZOLIN SODIUM/WATER 2 G/20 ML
2 SYRINGE (ML) INTRAVENOUS SEE ADMIN INSTRUCTIONS
Status: DISCONTINUED | OUTPATIENT
Start: 2025-04-04 | End: 2025-04-04 | Stop reason: HOSPADM

## 2025-04-04 RX ORDER — IBUPROFEN 800 MG/1
800 TABLET, FILM COATED ORAL EVERY 6 HOURS
Status: DISCONTINUED | OUTPATIENT
Start: 2025-04-05 | End: 2025-04-05 | Stop reason: HOSPADM

## 2025-04-04 RX ORDER — SODIUM PHOSPHATE,MONO-DIBASIC 19G-7G/118
1 ENEMA (ML) RECTAL DAILY PRN
Status: DISCONTINUED | OUTPATIENT
Start: 2025-04-06 | End: 2025-04-05 | Stop reason: HOSPADM

## 2025-04-04 RX ORDER — OXYTOCIN/0.9 % SODIUM CHLORIDE 30/500 ML
340 PLASTIC BAG, INJECTION (ML) INTRAVENOUS CONTINUOUS PRN
Status: DISCONTINUED | OUTPATIENT
Start: 2025-04-04 | End: 2025-04-04 | Stop reason: HOSPADM

## 2025-04-04 RX ORDER — DEXTROSE, SODIUM CHLORIDE, SODIUM LACTATE, POTASSIUM CHLORIDE, AND CALCIUM CHLORIDE 5; .6; .31; .03; .02 G/100ML; G/100ML; G/100ML; G/100ML; G/100ML
INJECTION, SOLUTION INTRAVENOUS CONTINUOUS
Status: DISCONTINUED | OUTPATIENT
Start: 2025-04-04 | End: 2025-04-05 | Stop reason: HOSPADM

## 2025-04-04 RX ORDER — MISOPROSTOL 200 UG/1
400 TABLET ORAL
Status: DISCONTINUED | OUTPATIENT
Start: 2025-04-04 | End: 2025-04-05 | Stop reason: HOSPADM

## 2025-04-04 RX ORDER — LOPERAMIDE HYDROCHLORIDE 2 MG/1
4 CAPSULE ORAL
Status: DISCONTINUED | OUTPATIENT
Start: 2025-04-04 | End: 2025-04-05 | Stop reason: HOSPADM

## 2025-04-04 RX ORDER — LIDOCAINE 40 MG/G
CREAM TOPICAL
Status: DISCONTINUED | OUTPATIENT
Start: 2025-04-04 | End: 2025-04-05 | Stop reason: HOSPADM

## 2025-04-04 RX ORDER — OXYTOCIN 10 [USP'U]/ML
10 INJECTION, SOLUTION INTRAMUSCULAR; INTRAVENOUS
Status: DISCONTINUED | OUTPATIENT
Start: 2025-04-04 | End: 2025-04-04 | Stop reason: HOSPADM

## 2025-04-04 RX ORDER — HYDROCORTISONE 25 MG/G
CREAM TOPICAL 3 TIMES DAILY PRN
Status: DISCONTINUED | OUTPATIENT
Start: 2025-04-04 | End: 2025-04-05 | Stop reason: HOSPADM

## 2025-04-04 RX ORDER — KETOROLAC TROMETHAMINE 30 MG/ML
INJECTION, SOLUTION INTRAMUSCULAR; INTRAVENOUS PRN
Status: DISCONTINUED | OUTPATIENT
Start: 2025-04-04 | End: 2025-04-04

## 2025-04-04 RX ORDER — DEXAMETHASONE SODIUM PHOSPHATE 4 MG/ML
4 INJECTION, SOLUTION INTRA-ARTICULAR; INTRALESIONAL; INTRAMUSCULAR; INTRAVENOUS; SOFT TISSUE
Status: DISCONTINUED | OUTPATIENT
Start: 2025-04-04 | End: 2025-04-04

## 2025-04-04 RX ORDER — ONDANSETRON 4 MG/1
4 TABLET, ORALLY DISINTEGRATING ORAL EVERY 30 MIN PRN
Status: DISCONTINUED | OUTPATIENT
Start: 2025-04-04 | End: 2025-04-04

## 2025-04-04 RX ORDER — CITRIC ACID/SODIUM CITRATE 334-500MG
SOLUTION, ORAL ORAL
Status: COMPLETED
Start: 2025-04-04 | End: 2025-04-04

## 2025-04-04 RX ORDER — CEFAZOLIN SODIUM/WATER 2 G/20 ML
2 SYRINGE (ML) INTRAVENOUS
Status: COMPLETED | OUTPATIENT
Start: 2025-04-04 | End: 2025-04-04

## 2025-04-04 RX ORDER — OXYTOCIN/0.9 % SODIUM CHLORIDE 30/500 ML
PLASTIC BAG, INJECTION (ML) INTRAVENOUS PRN
Status: DISCONTINUED | OUTPATIENT
Start: 2025-04-04 | End: 2025-04-04

## 2025-04-04 RX ORDER — ONDANSETRON 2 MG/ML
INJECTION INTRAMUSCULAR; INTRAVENOUS PRN
Status: DISCONTINUED | OUTPATIENT
Start: 2025-04-04 | End: 2025-04-04

## 2025-04-04 RX ORDER — BUPIVACAINE HYDROCHLORIDE 2.5 MG/ML
INJECTION, SOLUTION EPIDURAL; INFILTRATION; INTRACAUDAL; PERINEURAL
Status: DISCONTINUED | OUTPATIENT
Start: 2025-04-04 | End: 2025-04-04

## 2025-04-04 RX ORDER — OXYTOCIN/0.9 % SODIUM CHLORIDE 30/500 ML
100-340 PLASTIC BAG, INJECTION (ML) INTRAVENOUS CONTINUOUS PRN
Status: DISCONTINUED | OUTPATIENT
Start: 2025-04-04 | End: 2025-04-05 | Stop reason: HOSPADM

## 2025-04-04 RX ORDER — FENTANYL CITRATE 50 UG/ML
INJECTION, SOLUTION INTRAMUSCULAR; INTRAVENOUS
Status: COMPLETED | OUTPATIENT
Start: 2025-04-04 | End: 2025-04-04

## 2025-04-04 RX ORDER — LIDOCAINE HYDROCHLORIDE 10 MG/ML
INJECTION, SOLUTION EPIDURAL; INFILTRATION; INTRACAUDAL; PERINEURAL
Status: COMPLETED
Start: 2025-04-04 | End: 2025-04-04

## 2025-04-04 RX ORDER — LOPERAMIDE HYDROCHLORIDE 2 MG/1
4 CAPSULE ORAL
Status: DISCONTINUED | OUTPATIENT
Start: 2025-04-04 | End: 2025-04-04 | Stop reason: HOSPADM

## 2025-04-04 RX ORDER — OXYCODONE HYDROCHLORIDE 5 MG/1
5 TABLET ORAL EVERY 4 HOURS PRN
Status: DISCONTINUED | OUTPATIENT
Start: 2025-04-04 | End: 2025-04-05 | Stop reason: HOSPADM

## 2025-04-04 RX ORDER — METHYLERGONOVINE MALEATE 0.2 MG/ML
200 INJECTION INTRAVENOUS
Status: DISCONTINUED | OUTPATIENT
Start: 2025-04-04 | End: 2025-04-05 | Stop reason: HOSPADM

## 2025-04-04 RX ORDER — ONDANSETRON 2 MG/ML
4 INJECTION INTRAMUSCULAR; INTRAVENOUS EVERY 30 MIN PRN
Status: DISCONTINUED | OUTPATIENT
Start: 2025-04-04 | End: 2025-04-04 | Stop reason: HOSPADM

## 2025-04-04 RX ORDER — CARBOPROST TROMETHAMINE 250 UG/ML
250 INJECTION, SOLUTION INTRAMUSCULAR
Status: DISCONTINUED | OUTPATIENT
Start: 2025-04-04 | End: 2025-04-04 | Stop reason: HOSPADM

## 2025-04-04 RX ORDER — AMOXICILLIN 250 MG
2 CAPSULE ORAL 2 TIMES DAILY
Status: DISCONTINUED | OUTPATIENT
Start: 2025-04-04 | End: 2025-04-05 | Stop reason: HOSPADM

## 2025-04-04 RX ORDER — MAGNESIUM HYDROXIDE 1200 MG/15ML
LIQUID ORAL PRN
Status: DISCONTINUED | OUTPATIENT
Start: 2025-04-04 | End: 2025-04-04

## 2025-04-04 RX ORDER — BUPIVACAINE HYDROCHLORIDE 7.5 MG/ML
INJECTION, SOLUTION INTRASPINAL
Status: COMPLETED | OUTPATIENT
Start: 2025-04-04 | End: 2025-04-04

## 2025-04-04 RX ORDER — LOPERAMIDE HYDROCHLORIDE 2 MG/1
2 CAPSULE ORAL
Status: DISCONTINUED | OUTPATIENT
Start: 2025-04-04 | End: 2025-04-04 | Stop reason: HOSPADM

## 2025-04-04 RX ORDER — AMOXICILLIN 250 MG
1 CAPSULE ORAL 2 TIMES DAILY
Status: DISCONTINUED | OUTPATIENT
Start: 2025-04-04 | End: 2025-04-05 | Stop reason: HOSPADM

## 2025-04-04 RX ORDER — MORPHINE SULFATE 1 MG/ML
INJECTION, SOLUTION EPIDURAL; INTRATHECAL; INTRAVENOUS
Status: COMPLETED | OUTPATIENT
Start: 2025-04-04 | End: 2025-04-04

## 2025-04-04 RX ADMIN — Medication 2 G: at 12:55

## 2025-04-04 RX ADMIN — BUPIVACAINE 20 ML: 13.3 INJECTION, SUSPENSION, LIPOSOMAL INFILTRATION at 14:59

## 2025-04-04 RX ADMIN — SODIUM CHLORIDE, SODIUM LACTATE, POTASSIUM CHLORIDE, AND CALCIUM CHLORIDE 500 ML: .6; .31; .03; .02 INJECTION, SOLUTION INTRAVENOUS at 09:24

## 2025-04-04 RX ADMIN — Medication 600 ML/HR: at 13:30

## 2025-04-04 RX ADMIN — KETOROLAC TROMETHAMINE 15 MG: 15 INJECTION, SOLUTION INTRAMUSCULAR; INTRAVENOUS at 20:07

## 2025-04-04 RX ADMIN — PHENYLEPHRINE HYDROCHLORIDE 100 MCG: 10 INJECTION INTRAVENOUS at 13:32

## 2025-04-04 RX ADMIN — PROPOFOL 20 MG: 10 INJECTION, EMULSION INTRAVENOUS at 14:02

## 2025-04-04 RX ADMIN — BUPIVACAINE HYDROCHLORIDE 20 ML: 2.5 INJECTION, SOLUTION EPIDURAL; INFILTRATION; INTRACAUDAL at 14:59

## 2025-04-04 RX ADMIN — GLYCOPYRROLATE 0.2 MG: 0.2 INJECTION, SOLUTION INTRAMUSCULAR; INTRAVENOUS at 14:04

## 2025-04-04 RX ADMIN — FENTANYL CITRATE 25 MCG: 50 INJECTION, SOLUTION INTRAMUSCULAR; INTRAVENOUS at 14:19

## 2025-04-04 RX ADMIN — FENTANYL CITRATE 50 MCG: 50 INJECTION, SOLUTION INTRAMUSCULAR; INTRAVENOUS at 14:25

## 2025-04-04 RX ADMIN — ACETAMINOPHEN 975 MG: 325 TABLET, FILM COATED ORAL at 15:58

## 2025-04-04 RX ADMIN — Medication 30 ML: at 12:42

## 2025-04-04 RX ADMIN — FENTANYL CITRATE 15 MCG: 50 INJECTION INTRAMUSCULAR; INTRAVENOUS at 12:56

## 2025-04-04 RX ADMIN — LIDOCAINE HYDROCHLORIDE 10 ML: 10 INJECTION, SOLUTION EPIDURAL; INFILTRATION; INTRACAUDAL; PERINEURAL at 14:21

## 2025-04-04 RX ADMIN — FENTANYL CITRATE 10 MCG: 50 INJECTION, SOLUTION INTRAMUSCULAR; INTRAVENOUS at 14:09

## 2025-04-04 RX ADMIN — PHENYLEPHRINE HYDROCHLORIDE 200 MCG: 10 INJECTION INTRAVENOUS at 13:50

## 2025-04-04 RX ADMIN — Medication 0.15 MG: at 12:56

## 2025-04-04 RX ADMIN — Medication 20 MG: at 14:05

## 2025-04-04 RX ADMIN — SODIUM CHLORIDE, SODIUM LACTATE, POTASSIUM CHLORIDE, AND CALCIUM CHLORIDE: .6; .31; .03; .02 INJECTION, SOLUTION INTRAVENOUS at 12:50

## 2025-04-04 RX ADMIN — SODIUM CITRATE AND CITRIC ACID MONOHYDRATE 30 ML: 500; 334 SOLUTION ORAL at 12:42

## 2025-04-04 RX ADMIN — BUPIVACAINE HYDROCHLORIDE IN DEXTROSE 1.8 ML: 7.5 INJECTION, SOLUTION SUBARACHNOID at 12:56

## 2025-04-04 RX ADMIN — PHENYLEPHRINE HYDROCHLORIDE 50 MCG/MIN: 10 INJECTION INTRAVENOUS at 12:56

## 2025-04-04 RX ADMIN — Medication 10 MG: at 14:22

## 2025-04-04 RX ADMIN — PROPOFOL 10 MG: 10 INJECTION, EMULSION INTRAVENOUS at 14:22

## 2025-04-04 RX ADMIN — TRANEXAMIC ACID 1 G: 1 INJECTION, SOLUTION INTRAVENOUS at 14:26

## 2025-04-04 RX ADMIN — FLUOXETINE HYDROCHLORIDE 20 MG: 20 CAPSULE ORAL at 10:05

## 2025-04-04 RX ADMIN — OXYCODONE 5 MG: 5 TABLET ORAL at 16:06

## 2025-04-04 RX ADMIN — AZITHROMYCIN MONOHYDRATE 500 MG: 500 INJECTION, POWDER, LYOPHILIZED, FOR SOLUTION INTRAVENOUS at 13:16

## 2025-04-04 RX ADMIN — KETOROLAC TROMETHAMINE 30 MG: 30 INJECTION, SOLUTION INTRAMUSCULAR at 14:37

## 2025-04-04 RX ADMIN — ONDANSETRON 4 MG: 2 INJECTION INTRAMUSCULAR; INTRAVENOUS at 12:55

## 2025-04-04 RX ADMIN — PROPOFOL 10 MG: 10 INJECTION, EMULSION INTRAVENOUS at 14:25

## 2025-04-04 RX ADMIN — PHENYLEPHRINE HYDROCHLORIDE 100 MCG: 10 INJECTION INTRAVENOUS at 13:40

## 2025-04-04 RX ADMIN — ACETAMINOPHEN 975 MG: 325 TABLET, FILM COATED ORAL at 22:17

## 2025-04-04 ASSESSMENT — ACTIVITIES OF DAILY LIVING (ADL)
ADLS_ACUITY_SCORE: 16
ADLS_ACUITY_SCORE: 16
ADLS_ACUITY_SCORE: 17
ADLS_ACUITY_SCORE: 16
ADLS_ACUITY_SCORE: 17
ADLS_ACUITY_SCORE: 16
ADLS_ACUITY_SCORE: 17
ADLS_ACUITY_SCORE: 16
ADLS_ACUITY_SCORE: 17
ADLS_ACUITY_SCORE: 17
ADLS_ACUITY_SCORE: 16
ADLS_ACUITY_SCORE: 17
ADLS_ACUITY_SCORE: 16

## 2025-04-04 NOTE — PLAN OF CARE
Data: Charly Kee transferred to 7122 via gurney at 1640.   Action: Receiving unit notified of transfer: Yes. Patient and family notified of room change. Report given to ROSEANNA Mendieta at 1600 with updates at time of transfer. Belongings sent to receiving unit. Accompanied by Registered Nurse. Oriented patient to surroundings. Call light within reach.  Response: Patient tolerated transfer and is stable.

## 2025-04-04 NOTE — PLAN OF CARE
A/Ox4. VSS on RA. Denies pre-e sx. Pt still c/o some uterine ctx, but not as frequent as previous. Nifedipine given at 2200. Abdomen is soft per palpation. Pt is able to rest after Atarax and Flexeril given at 2219. Also, provided Aqua-k heating pad. Pt denies vaginal discharges/bleeding. Pt stated active fetal movement. Spouse is at bed side and is supportive. Continue plan of care.    04/03/25 2004   Uterine Activity Assessment   Method per patient report;external tocotransducer   Contraction Frequency (Minutes) x2   Contraction Duration (seconds) 40-50   Contraction Intensity mild by palpation   Uterine Resting Tone soft by palpation   Fetal Assessment   Fetal Movement active   Fetal HR Assessment Method external US   Fetal HR (beats/min) 145   Fetal HR Baseline normal range   Fetal HR Variability moderate (amplitude range 6 to 25 bpm)   Fetal HR Accelerations increase 15 bpm above baseline lasting 15 seconds   Fetal HR Decelerations absent

## 2025-04-04 NOTE — PROVIDER NOTIFICATION
"   04/04/25 7471   Provider Notification   Provider Name/Title    Method of Notification Electronic Page   Request Evaluate-Remote   Notification Reason Status Update  (notified the G3 that hte pt was c/o SOB and pain with deep breaths.)     Notified the G3 that the pt called out stating that \"she feels weird and is having chest pain when take deep breaths\". Vitals were stable, BG was 96, pt did desat down to 89, put on 1L O2 with simple mask. Provider ordered a stat CBC to be collected.   "

## 2025-04-04 NOTE — ANESTHESIA PROCEDURE NOTES
TAP Procedure Note    Pre-Procedure   Staff -        Anesthesiologist:  Najma Quijano MD       Resident/Fellow: Valentin Merida MD       Other Anesthesia Staff: Sohan Benites MD       Performed By: resident       Location: OB       Procedure Start/Stop Times: 4/4/2025 4:50 PM and 4/4/2025 4:59 PM       Pre-Anesthestic Checklist: patient identified, IV checked, site marked, risks and benefits discussed, informed consent, monitors and equipment checked, pre-op evaluation, at physician/surgeon's request and post-op pain management  Timeout:       Correct Patient: Yes        Correct Procedure: Yes        Correct Site: Yes        Correct Position: Yes        Correct Laterality: Yes        Site Marked: Yes  Procedure Documentation  Procedure: TAP         Laterality: bilateral       Skin prep: Chloraprep       Insertion Site: T10-11, T9-10.       Needle Gauge: 21.        Needle Length (millimeters): 110        Ultrasound guided       1. Ultrasound was used to identify targeted nerve, plexus, vascular marker, or fascial plane and place a needle adjacent to it in real-time.       2. Ultrasound was used to visualize the spread of anesthetic in close proximity to the above referenced structure.       3. A permanent image is entered into the patient's record.       4. The visualized anatomic structures appeared normal.       5. There were no apparent abnormal pathologic findings.    Assessment/Narrative         The placement was negative for: blood aspirated, painful injection and site bleeding       Paresthesias: No.       Bolus given via needle..        Secured via.        Insertion/Infusion Method: Single Shot    Medication(s) Administered   Bupivacaine 0.25% PF (Infiltration) - Infiltration   20 mL - 4/4/2025 2:59:00 PM  Bupivacaine liposome (Exparel) 1.3% LA inj susp (Infiltration) - Infiltration   20 mL - 4/4/2025 2:59:00 PM  Medication Administration Time: 4/4/2025 4:50 PM      FOR [facility] ([facility] [place]  "ONLY:   Pain Team Contact information: please page the Pain Team Via [facility]. Search \"Pain\". During daytime hours, please page the attending first. At night please page the resident first.      "

## 2025-04-04 NOTE — OP NOTE
[facility]  Full Operative Note     Surgery Date:  2025    Surgeon:  Diane Swann MD    Assistants:  Federico Pollock DO, MS PGY-4    Pre-op Diagnosis:    - Intrauterine pregnancy at 34w1d  -  contractions   - Cat II FHT  - Hx CS x2  - Hx  x2  - Paragard IUD in-situ  - Chronic Anemia     Post-op Diagnosis:    - Same   - Vigorous female infant     Procedure:   - Repeat low-transverse  section with single layer uterine closure via pfannenstiel incision  - Paragard IUD removal    Anesthesia: Spinal  EBL: 904 ml   IVF: 1500 mL crystalloid  UOP: 400 mL clear urine at the end of the case  Drains: Pinto Catheter   Specimens:  ID Type Source Tests Collected by Time Destination   A :  Cord blood, arterial Umbilical Cord OR DOCUMENTATION ONLY Diane Swann MD 2025  2:04 PM    B :  Cord blood, venous Umbilical Cord OR DOCUMENTATION ONLY Diane Swann MD 2025  2:05 PM    C :  Tissue Umbilical Cord OR DOCUMENTATION ONLY Diane Swann MD 2025  2:05 PM    D :  Placenta Placenta OR DOCUMENTATION ONLY Diane Swann MD 2025  2:05 PM      Additional Medications: 2g Ancef, 500mg Azithromycin, 1g TXA   Complications: None apparent    Indications:   Charly Kee is a 25 year old  at 34w1d who was initially admitted to the antepartum service following onset of  contractions. Due to concern for possible  labor patient was admitted for prolonged observation, and administered betamethasone with Nifedipine for tocolysis through the beta window. Today, the patient awoke with onset of regular, painful contractions. FHT with decelerations, periods of minimal variability. Intrauterine resuscitative efforts were attempted with IV fluid bolus but this did not resolve cat II. Patient observed over approximately 4 hours. Due to persistent category 2 fetal heart tracing, painful contractions, recommendation was made for delivery.  The  risks, benefits, and alternatives of  section were discussed with the patient including bleeding, infection, injury to surrounding structures (nerves, blood vessels, uterus, cervix, tubes, ovaries, bladder, bowel, rarely baby), and she agreed to proceed. Written consent obtained for proceeding with  section and for blood transfusion in the setting of hemorrhage or acute blood loss anemia.    Findings:   Moderate subcutaneous adhesions. Moderate rectofascial adhesions. Significant omental adhesions to rectus muscle. Right > left. Otherwise minimal intraabdominal adhesions. No subcutaneous, rectofascial, or intraabdominal adhesions  Retained Paragard IUD visualized immediately upon hysterotomy laying on amniotic sac. Removed prior to amniotomy.   Clear amniotic fluid. Umbilical cord noted to be wrapped around fetal neck, body, and right lower extremity. Reduced following delivery.  Vigorous female infant in vertex presentation. Apgars 8 at 1 minute & 8 at 5 minutes. Weight 2.44 kg (5 lb 6.1 oz) .  Following placenta removal, multiple uterine sweeps required to complete remove placental remnants.   Rectus muscle reapproximated at midportion of muscle with figure of X stitch of 0 PDS. Fascial layer closed with Looped 0 PDS.   Normal uterus, fallopian tubes, and ovaries.   Cord Gases:   Latest Reference Range & Units 25 13:39   Ph Cord Arterial 7.16 - 7.39  7.22   PCO2 Cord Arterial 35 - 71 mm Hg 52   PO2 Cord Arterial 10 - 33 mm Hg 20   Bicarbonate Cord Arterial 16 - 24 mmol/L 21   Base Excess/Deficit >-10.0 - -2.0 mmol/L -7.1   Ph Cord Blood Venous 7.21 - 7.45  7.35   PCO2 Cord Venous 27 - 57 mm Hg 43   PO2 Cord Venous 21 - 37 mm Hg 23   Bicarbonate Cord Venous 16 - 24 mmol/L 24   Base Excess/Deficit Cord Venous >-10.0 - -2.0 mmol/L -1.9 (H)   (H): Data is abnormally high    Procedure Details:   The patient was brought to the OR, where adequate spinal anesthesia was administered.  She was placed  in the dorsal supine position with a slight leftward tilt. She was prepped and draped in the usual sterile fashion. A surgical time out was performed. A pfannenstiel skin incision was made with the scalpel, and carried down to the underlying fascia with sharp and blunt dissection. The fascia was incised in the midline, and the incision was extended laterally with the Diaz scissors. The superior aspect of the fascia was grasped with the Kocher clamps and dissected off of the underlying rectus muscles with blunt and sharp dissection. Attention was then turned to the inferior aspect of the fascia, which was similarly dissected off of the underlying rectus muscles. The rectus muscles were  in the midline, and the peritoneum was entered bluntly, and the opening was extended with digital pressure and electrosurgery.  Upon entry into the abdomen, moderate omental adhesions were noted to the right rectus muscle.  Additional space was needed to facilitate delivery.  A small approximately 1 cm area of the superior right rectus muscle was taken down with electrocautery.  Another approximately 1 cm area of the left superior rectus muscle was taken down with bandage scissors.  The bladder blade was then placed. A transverse hysterotomy was made with the scalpel in the lower uterine segment, and the incision was extended with digital pressure.  Immediately upon entry into the uterine cavity the known retained ParaGard IUD was visualized overlying the amniotic sac near the fetal head.  This ParaGard was removed, inspected and found to be intact.  The infant was noted to be in the vertex position, amniotomy was performed with clear fluid and the infant was delivered atraumatically. The shoulders delivered easily.  The umbilical cord was noted to be around the fetal neck, body, and right foot.  This was reduced immediately following delivery. The cord was doubly clamped and cut after 60 seconds, and the infant was handed  off to the awaiting NICU staff. A segment of cord was cut and collected. The placenta was delivered with gentle traction on the umbilical cord and uterine massage. The uterus was exteriorized and cleared of all clots and debris.  Multiple uterine sweeps were required to completely remove all placental remnants.  Uterine tone was noted to be firm with high-dose pitocin given through the running IV and uterine massage.  The hysterotomy was closed with a running locked suture of 0 Vicryl.  An additional area of bleeding was noted to the right corner of the hysterotomy.  A figure of X stitch was placed at the site with 0 Vicryl to good effect.  The hysterotomy was noted to be hemostatic.      The posterior cul-de-sac was cleared of all clots and debris. The uterus was returned to the abdomen. The pericolic gutters were cleared of all clots and debris. The hysterotomy was reexamined and noted to be hemostatic. The fascia and rectus muscles were examined and areas of oozing were controlled with electrocautery.  The incised portions of the rectus muscle were inspected and found to be hemostatic.  The rectus muscles were then reapproximated at the midportion with a figure of X stitch of 0 PDS.  The fascia was closed with a running looped 0 PDS suture. The subcutaneous tissue was irrigated and areas of oozing were controlled with electrocautery. The skin was closed with 4-0 Monocryl and covered with steri strips and overlying sterile dressing.    All sponge, needle, and instrument counts were correct. The patient tolerated the procedure well, and was transferred to recovery in stable condition.     Dr. Swann was present and scrubbed for the procedure.     Federico Pollock DO, MS  Obstetrics, Gynecology & Women's Health   Resident, PGY-4  04/04/2025 4:33 PM

## 2025-04-04 NOTE — PROVIDER NOTIFICATION
04/04/25 0901 04/04/25 0902   Provider Notification   Provider Name/Title Dr Phill Pollock   Method of Notification Phone At Bedside   Request Evaluate in Person  --    Notification Reason Decels  --      Prolonged decel [phone].   Pt repositioned. IVF bolus started.   MD Pollock notified and to bedside to assess.   SVE 2/60/-3 per MD.   MD discussed plan of care.

## 2025-04-04 NOTE — PLAN OF CARE
Delivery of viable female via repeat  section at 1328. NICU present due to gestational age.   IUD removed and discarded at 1327.   Surgery in progress.

## 2025-04-04 NOTE — ANESTHESIA PREPROCEDURE EVALUATION
Anesthesia Pre-Procedure Evaluation    Patient: Charly Kee   MRN: [id number] : 2000        Procedure : * No procedures listed *          Past Medical History:   Diagnosis Date    NO ACTIVE PROBLEMS       History reviewed. No pertinent surgical history.   Allergies   Allergen Reactions    Benadryl [Diphenhydramine] Shortness Of Breath and Rash    Latex Rash      Social History     Tobacco Use    Smoking status: Not on file    Smokeless tobacco: Not on file   Substance Use Topics    Alcohol use: Not on file      Wt Readings from Last 1 Encounters:   25 98.4 kg (217 lb)        Anesthesia Evaluation   Pt has had prior anesthetic.         ROS/MED HX  ENT/Pulmonary:  - neg pulmonary ROS     Neurologic:  - neg neurologic ROS     Cardiovascular:  - neg cardiovascular ROS     METS/Exercise Tolerance:     Hematologic:     (+)      anemia,          Musculoskeletal:  - neg musculoskeletal ROS     GI/Hepatic:  - neg GI/hepatic ROS     Renal/Genitourinary:  - neg Renal ROS     Endo:     (+)               Obesity,       Psychiatric/Substance Use:     (+) psychiatric history anxiety and depression       Infectious Disease:  - neg infectious disease ROS     Malignancy:       Other:   33w6d   who is transfer from [place] due to  contractions  IUD currently in place.   Hx of c/s x2  Hx of  x2     (+)  , ,previous  (x2)         Physical Exam    Airway        Mallampati: II   TM distance: > 3 FB   Neck ROM: full     Respiratory Devices and Support         Dental       (+) Minor Abnormalities - some fillings, tiny chips    B=Bridge, C=Chipped, L=Loose, M=Missing    Cardiovascular   cardiovascular exam normal       Rhythm and rate: regular and normal     Pulmonary   pulmonary exam normal        breath sounds clear to auscultation           OUTSIDE LABS:  CBC:   Lab Results   Component Value Date    WBC 8.8 2025    WBC 11.2 (H) 2013    HGB 8.1 (L) 2025    HGB 12.5  "2013    HCT 26.4 (L) 2025    HCT 35.2 2013     2025     2013     BMP:   Lab Results   Component Value Date     2013    POTASSIUM 3.7 2013    CHLORIDE 108 2013    CO2 17 (L) 2013    BUN 13 2013    CR 0.69 2013     (H) 2013     COAGS: No results found for: \"PTT\", \"INR\", \"FIBR\"  POC:   Lab Results   Component Value Date    HCGS Positive (A) 2013     HEPATIC: No results found for: \"ALBUMIN\", \"PROTTOTAL\", \"ALT\", \"AST\", \"GGT\", \"ALKPHOS\", \"BILITOTAL\", \"BILIDIRECT\", \"RAVEN\"  OTHER:   Lab Results   Component Value Date    PH 5.5 2003    JATIN 9.7 2013       Anesthesia Plan    ASA Status:  3    NPO Status:  ELEVATED Aspiration Risk/Unknown    Anesthesia Type: Epidural, Spinal, General.              Consents    Anesthesia Plan(s) and associated risks, benefits, and realistic alternatives discussed. Questions answered and patient/representative(s) expressed understanding.     - Discussed: Risks, Benefits and Alternatives for BOTH SEDATION and the PROCEDURE were discussed     - Discussed with:  Patient      - Extended Intubation/Ventilatory Support Discussed: No.      - Patient is DNR/DNI Status: No     Use of blood products discussed: Yes.     - Discussed with: Patient.     - Consented: consented to blood products     Postoperative Care            Comments:    Other Comments: Patient here with concerning prolonged decelerations during  contractions. Likely planning C/S. Discussed with patient the possible anesthetic options, including general, spinal, and combined spinal epidural, depending on urgency of the C/S. Patient has anemia with Hgb 8.1 and plt of 159 on 25. Will re-draw CBC to re-check today. Given her low hgb and hx of 2 previous C/S will plant to have 2 units of pRBCs in the room. Will also want two IVs for adequate access and possible transfusion. No contraindications to any uterotonics. "           Sohan Benites MD    Clinically Significant Risk Factors

## 2025-04-04 NOTE — PROGRESS NOTES
Maternal-Fetal Medicine   Antepartum Progress Note    Date: 04/04/2025    Late entry secondary to acute patients cares. Patient initially seen at 0900 and again x3 since that time    Subjective   Charly is feeling okay this morning.  She does report an onset of contractions around 0600 that have become increasingly more painful. Intermittent pelvic pressure.  She last ate yesterday evening.  Endorses good fetal movement.  Denies any leakage of fluid or vaginal bleeding.  No other concerns at this time.    Objective     Vitals:    04/04/25 1158 04/04/25 1204 04/04/25 1218 04/04/25 1220   BP:   130/72 128/69   Pulse:  86     Resp:       Temp:   98.4  F (36.9  C)    TempSrc:       SpO2: 97%  97%    Weight:           No intake/output data recorded.    Gen:  Resting in bed, mild distress with contractions.  Partner at bedside.  CV:  Regular rate, appears well perfused  Pulm: non-labored breathing on room air  Abd: Gravid, mild to moderate tenderness to palpation of lower abdominal quadrants left greater than right and suprapubic area.   SVE: 2/60/-3 (0905, RN present in room).  Ext: Warm, well perfused, trace edema bilaterally.    FHT: 150 baseline, moderate with periods of minimal variability, + accels, occasional late decels  TOCO: 1-2 ctx in 10 minute period    Bedside ultrasound: Fetus cephalic, lower uterine segment appeared intact with no bulging. Anterior placenta.    Recent Labs:    Latest Reference Range & Units 04/04/25 10:01   Sodium 135 - 145 mmol/L 141   Potassium 3.4 - 5.3 mmol/L 3.7   Chloride 98 - 107 mmol/L 110 (H)   Carbon Dioxide (CO2) 22 - 29 mmol/L 19 (L)   Urea Nitrogen 6.0 - 20.0 mg/dL 6.9   Creatinine 0.51 - 0.95 mg/dL 0.59   GFR Estimate >60 mL/min/1.73m2 >90   Calcium 8.8 - 10.4 mg/dL 8.4 (L)   Anion Gap 7 - 15 mmol/L 12   Albumin 3.5 - 5.2 g/dL 3.0 (L)   Protein Total 6.4 - 8.3 g/dL 6.1 (L)   Alkaline Phosphatase 40 - 150 U/L 128   ALT 0 - 50 U/L 6   AST 0 - 45 U/L 11   Bilirubin Total <=1.2  mg/dL 0.2   Glucose 70 - 99 mg/dL 75   WBC 4.0 - 11.0 10e3/uL 8.2   Hemoglobin 11.7 - 15.7 g/dL 7.6 (L)   Hematocrit 35.0 - 47.0 % 25.3 (L)   Platelet Count 150 - 450 10e3/uL 147 (L)   RBC Count 3.80 - 5.20 10e6/uL 3.31 (L)   MCV 78 - 100 fL 76 (L)   MCH 26.5 - 33.0 pg 23.0 (L)   MCHC 31.5 - 36.5 g/dL 30.0 (L)   RDW 10.0 - 15.0 % 15.4 (H)   ABO/Rh(D)  A POS   Antibody Screen Negative  Negative   SPECIMEN EXPIRATION DATE     (H): Data is abnormally high  (L): Data is abnormally low    Assessment/Plan   Charly Kee is a 25 year old  female at 34w1d by 7w1d US who is HD#3 initially admitted for management of  contractions.  On admission patient was administered betamethasone, given nifedipine for toco lysis through the beta window.  Nifedipine since stopped.  Patient now with onset of contractions this morning that are regular, painful.  SVE today 2/60/-3, change from last SVE which was performed yesterday.  Fetal heart tracing today notable for category 2 fetal heart tracing with intermittent periods of category 1.  FHT with decelerations, periods of minimal variability.  Intrauterine resuscitative efforts were attempted with IV fluid bolus but this did not resolve category 2.  Patient observed over approximately 4 hours.  Due to persistent category 2 fetal heart tracing, painful contractions, recommendation was made for delivery.  Discussed with patient at length route of delivery.  Ultimately amendable to proceeding with  section.      Urgent C-section called at 12:19 PM.    Sarah Swift notified.    Charge RN aware.    Anesthesia team aware.    Orders placed.    Pregnancy is additionally notable for the below:    #  Labor  # Cat II FHT  - BMZ x2 (-)  - s/p PCN (due to GBS unknown status on admission)  - s/p nifed tocolysis  - UA neg, wet prep neg, GC/CT neg  - GBS negative 25  - BPP (4/3)     # History of  x2  # Pregnancy with IUD in-situ    Reviewed with patient route of delivery.  Given category 2 fetal heart tracing, recommendation made to proceed with repeat .  Patient is amendable to this.  Of note this pregnancy occurred with ParaGard IUD in place.  Patient also consented for ParaGard IUD removal.    - Discussed risks and benefits of procedure, including but not limited to bleeding, infection, injury to surrounding organs (vagina, cervix, uterus, ovaries, fallopian tubes, bowel, bladder, ureters, blood vessels and nerves of the pelvis), injury to infant, and the potential need for another surgery should an intraoperative injury go unrecognized or if patient were to have continued bleeding. Patient had time to ask questions and expressed understanding of procedure and associated risks. Agreed to blood transfusion if necessary in the event of hemorrhage or acute blood loss anemia.  Surgical and blood consent signed.  -Reviewed with patient and her  that per her report this morning NICU is currently at capacity.  Discussed that there is a high probability of baby/patient being transferred to [facility].  Will ultimately defer final decision to NICU team.  They expressed understanding of this.    # Anemia  Patient with hemoglobin of 7.6 on repeat labs this morning.    -Administer 1 unit of blood.  More as needed.  Plan for posttransfusion transfusion/postoperative hemoglobin later today.  -Ferritin added to am labs, and if low will discuss IV Iron    # Anxiety/depression  - Continue PTA fluoxetine, PRN hydroxyzine     # Hepatitis B nonimmune  Unable to see immunization records on outside chart  - Will offer vaccine series postpartum     # Prenatal Care  - Rh +, Rubella immune, GBS negative    Patient seen and care plan discussed with Dr. Smith.     Federico Pollock DO, MS  Obstetrics, Gynecology & Women's Health   Resident, PGY-4  2025 12:51 PM    Physician Attestation   I saw this patient with the resident and  agree with the resident/fellow's findings and plan of care as documented in the note.      Key findings: 26 yo  at 34w 1d admitted for evaluation for  labor. Today Charly had onset of regular painful contractions, abdominal pain and cervical change to 2 cm. She also had intermittent category 2 fetal heart rate tracing. Resuscitative measures employed,including fluid bolus and repositioning. Bedside ultrasound performed to evaluate for uterine dehiscence or rupture given her history of thin lower uterine segment and two prior C/S, abdominal pain, contractions and intermittent category 2 tracing. No over rupture or dehiscence noted on ultrasound. Decision made to proceed with  section given on-going painful contractions with cervical change, and intermittent category 2 FHT with two prior C/S.    Of note, Hgb returned 7.6 mg/dL (previously 8.1 mg/dl). Suspected CARLOS given history of CARLSO, but will add ferritin to confirm. Recommend transfusing 1 units of PRBC at this time given current hemoglobin and anticipated delivery. The patient is accepting of transfusion at this time. If ferritin low will also plan to IV iron postpartum.    45 MINUTES SPENT BY ME on the date of service doing chart review, history, exam, documentation & further activities per the note.    I have personally reviewed the following data over the past 24 hrs:    8.2  \   7.6 (L)   / 147 (L)     141 110 (H) 6.9 /  75   3.7 19 (L) 0.59 \     ALT: 6 AST: 11 AP: 128 TBILI: 0.2   ALB: 3.0 (L) TOT PROTEIN: 6.1 (L) LIPASE: N/A         Andria Smith MD  Date of Service (when I saw the patient): 25

## 2025-04-04 NOTE — PROVIDER NOTIFICATION
25 1211   Provider Notification   Provider Name/Title Dr Pollock   Method of Notification At Bedside     Decision made for repeat  section. Given category 2 tracing and continued painful contractions.   1unit pRBCs running for hgb 7.6.   Surgery prep completed.   To OR at 1247.

## 2025-04-04 NOTE — ANESTHESIA PROCEDURE NOTES
"Intrathecal injection Procedure Note    Pre-Procedure   Staff -        Anesthesiologist:  Najma Quijano MD       Resident/Fellow: Sohan Benites MD       Performed By: resident       Location: OR       Procedure Start/Stop Times: 4/4/2025 12:56 PM       Pre-Anesthestic Checklist: patient identified, IV checked, risks and benefits discussed, informed consent, monitors and equipment checked, pre-op evaluation, at physician/surgeon's request and post-op pain management  Timeout:       Correct Patient: Yes        Correct Procedure: Yes        Correct Site: Yes        Correct Position: Yes   Procedure Documentation  Procedure: intrathecal injection         Diagnosis: C/section       Patient Position: sitting       Skin prep: Chloraprep       Insertion Site: L3-4. (midline approach).       Needle Gauge: 25.        Needle Length (Inches): 3.5        Spinal Needle Type: Pencan       Introducer used       Introducer: 20 G       # of attempts: 1 and  # of redirects:  0    Assessment/Narrative         Paresthesias: No.       Sensory Level: T4       CSF fluid: clear.       Opening pressure was cmH2O while  Sitting.      Medication(s) Administered   0.75% Hyperbaric Bupivacaine (Intrathecal) - Intrathecal   1.8 mL - 4/4/2025 12:56:00 PM  Fentanyl PF (Intrathecal) - Intrathecal   15 mcg - 4/4/2025 12:56:00 PM  Morphine PF 1 mg/mL (Intrathecal) - Intrathecal   0.15 mg - 4/4/2025 12:56:00 PM  Medication Administration Time: 4/4/2025 12:56 PM      FOR [facility] ([facility]) ONLY:   Pain Team Contact information: please page the Pain Team Via [employer]. Search \"Pain\". During daytime hours, please page the attending first. At night please page the resident first.      "

## 2025-04-04 NOTE — CARE PLAN
Patient arrived to [facility] unit via zoom cart at 1640 ,with belongings, accompanied by spouse/ significant other, with infant in  NICU transfer to [facility] . Received report from  ROSEANNA pineda  and checked bands. Unit and room orientation completd. Call light given; no concerns present at this time. Continue with plan of care.

## 2025-04-04 NOTE — ANESTHESIA CARE TRANSFER NOTE
Patient: Charly Kee    Procedure: Procedure(s):   section       Diagnosis: 34 weeks gestation of pregnancy [Z3A.34]  Diagnosis Additional Information: No value filed.    Anesthesia Type:   Epidural, Spinal, General     Note:    Oropharynx: oropharynx clear of all foreign objects  Level of Consciousness: awake  Oxygen Supplementation: room air    Independent Airway: airway patency satisfactory and stable  Dentition: dentition unchanged  Vital Signs Stable: post-procedure vital signs reviewed and stable  Report to RN Given: handoff report given  Patient transferred to: PACU    Handoff Report: Identifed the Patient, Identified the Reponsible Provider, Reviewed the pertinent medical history, Discussed the surgical course, Reviewed Intra-OP anesthesia mangement and issues during anesthesia, Set expectations for post-procedure period and Allowed opportunity for questions and acknowledgement of understanding      Vitals:  Vitals Value Taken Time   BP     Temp     Pulse 70 25 1510   Resp 18 25 1510   SpO2 99 % 25 1510   Vitals shown include unfiled device data.    Electronically Signed By: Sohan Benites MD  2025  3:10 PM

## 2025-04-04 NOTE — BRIEF OP NOTE
[facility]    Brief Operative Note    Pre-operative diagnosis: 34 weeks gestation of pregnancy [Z3A.34]  Post-operative diagnosis Same as pre-operative diagnosis    Procedure:  section, N/A - Abdomen    Surgeon: Surgeons and Role:     * Diane Swann MD - Primary     * Federico Pollock MD - Resident - Assisting  Anesthesia: Spinal   Estimated Blood Loss: 904  IVF 1500ml, 2u pRBC  UOP 400ml  Additional medications: 1g TXA, 500mg Azithromycin, 2g Ancef    Drains: None  Specimens:   ID Type Source Tests Collected by Time Destination   A :  Cord blood, arterial Umbilical Cord OR DOCUMENTATION ONLY Diane Swann MD 2025  2:04 PM    B :  Cord blood, venous Umbilical Cord OR DOCUMENTATION ONLY Diane Swann MD 2025  2:05 PM    C :  Tissue Umbilical Cord OR DOCUMENTATION ONLY Diane Swann MD 2025  2:05 PM    D :  Placenta Placenta OR DOCUMENTATION ONLY Diane Swann MD 2025  2:05 PM      Findings:   Moderate subcutaneous adhesions. Moderate rectofascial adhesions. Significant omental adhesions to rectus muscle. Right > left. Otherwise minimal intraabdominal adhesions. No subcutaneous, rectofascial, or intraabdominal adhesions  Retained Paragard IUD visualized immediately upon hysterotomy laying on amniotic sac. Removed prior to amniotomy.   Clear amniotic fluid. Umbilical cord noted to be wrapped around fetal neck, body, and right lower extremity. Reduced following delivery.  Vigorous female infant in vertex presentation. Apgars 8 at 1 minute & 8 at 5 minutes. Weight 2.44 kg (5 lb 6.1 oz) .  Following placenta removal, multiple uterine sweeps required to complete remove placental remnants.   Rectus muscle reapproximated at midportion of muscle with figure of X stitch of 0 PDS. Fascial layer closed with Looped 0 PDS.   Normal uterus, fallopian tubes, and ovaries.     Complications: None.   Implants: * No implants in log  *    Dr. Swann was present and scrubbed for the entire procedure. Full operative note to follow.     Federico Pollock DO, MS  Obstetrics, Gynecology & Women's Health   Resident, PGY-4  04/04/2025 2:59 PM

## 2025-04-05 ENCOUNTER — HOSPITAL ENCOUNTER (INPATIENT)
Facility: CLINIC | Age: 25
LOS: 4 days | Discharge: HOME | End: 2025-04-09
Attending: OBSTETRICS & GYNECOLOGY | Admitting: OBSTETRICS & GYNECOLOGY

## 2025-04-05 ENCOUNTER — APPOINTMENT (OUTPATIENT)
Dept: CARDIOLOGY | Facility: CLINIC | Age: 25
End: 2025-04-05

## 2025-04-05 VITALS
RESPIRATION RATE: 16 BRPM | DIASTOLIC BLOOD PRESSURE: 85 MMHG | WEIGHT: 217 LBS | HEART RATE: 67 BPM | TEMPERATURE: 98.4 F | OXYGEN SATURATION: 99 % | SYSTOLIC BLOOD PRESSURE: 120 MMHG

## 2025-04-05 DIAGNOSIS — Z98.891 HISTORY OF CESAREAN SECTION: ICD-10-CM

## 2025-04-05 LAB
HGB BLD-MCNC: 9 G/DL (ref 11.7–15.7)
LVEF ECHO: NORMAL
NT-PROBNP SERPL-MCNC: <36 PG/ML (ref 0–450)
TROPONIN T SERPL HS-MCNC: <6 NG/L
TROPONIN T SERPL HS-MCNC: <6 NG/L

## 2025-04-05 PROCEDURE — 83880 ASSAY OF NATRIURETIC PEPTIDE: CPT

## 2025-04-05 PROCEDURE — 250N000013 HC RX MED GY IP 250 OP 250 PS 637

## 2025-04-05 PROCEDURE — 120N000013 HC R&B IMCU

## 2025-04-05 PROCEDURE — 84484 ASSAY OF TROPONIN QUANT: CPT

## 2025-04-05 PROCEDURE — 250N000013 HC RX MED GY IP 250 OP 250 PS 637: Performed by: OBSTETRICS & GYNECOLOGY

## 2025-04-05 PROCEDURE — 85018 HEMOGLOBIN: CPT

## 2025-04-05 PROCEDURE — 93306 TTE W/DOPPLER COMPLETE: CPT

## 2025-04-05 PROCEDURE — 250N000011 HC RX IP 250 OP 636: Performed by: OBSTETRICS & GYNECOLOGY

## 2025-04-05 PROCEDURE — 36415 COLL VENOUS BLD VENIPUNCTURE: CPT

## 2025-04-05 PROCEDURE — 93306 TTE W/DOPPLER COMPLETE: CPT | Mod: 26 | Performed by: INTERNAL MEDICINE

## 2025-04-05 PROCEDURE — 250N000011 HC RX IP 250 OP 636: Mod: JZ

## 2025-04-05 RX ORDER — BISACODYL 10 MG
10 SUPPOSITORY, RECTAL RECTAL DAILY PRN
Status: DISCONTINUED | OUTPATIENT
Start: 2025-04-07 | End: 2025-04-09 | Stop reason: HOSPADM

## 2025-04-05 RX ORDER — LOPERAMIDE HYDROCHLORIDE 2 MG/1
2 CAPSULE ORAL
Status: DISCONTINUED | OUTPATIENT
Start: 2025-04-05 | End: 2025-04-09 | Stop reason: HOSPADM

## 2025-04-05 RX ORDER — KETOROLAC TROMETHAMINE 15 MG/ML
15 INJECTION, SOLUTION INTRAMUSCULAR; INTRAVENOUS EVERY 6 HOURS
Status: DISCONTINUED | OUTPATIENT
Start: 2025-04-06 | End: 2025-04-06

## 2025-04-05 RX ORDER — TRANEXAMIC ACID 10 MG/ML
1 INJECTION, SOLUTION INTRAVENOUS EVERY 30 MIN PRN
Status: DISCONTINUED | OUTPATIENT
Start: 2025-04-05 | End: 2025-04-09 | Stop reason: HOSPADM

## 2025-04-05 RX ORDER — SODIUM PHOSPHATE,MONO-DIBASIC 19G-7G/118
1 ENEMA (ML) RECTAL DAILY PRN
Status: DISCONTINUED | OUTPATIENT
Start: 2025-04-07 | End: 2025-04-09 | Stop reason: HOSPADM

## 2025-04-05 RX ORDER — NALOXONE HYDROCHLORIDE 0.4 MG/ML
0.4 INJECTION, SOLUTION INTRAMUSCULAR; INTRAVENOUS; SUBCUTANEOUS
Status: DISCONTINUED | OUTPATIENT
Start: 2025-04-05 | End: 2025-04-09 | Stop reason: HOSPADM

## 2025-04-05 RX ORDER — ACETAMINOPHEN 325 MG/1
975 TABLET ORAL EVERY 6 HOURS
Status: DISCONTINUED | OUTPATIENT
Start: 2025-04-05 | End: 2025-04-09 | Stop reason: HOSPADM

## 2025-04-05 RX ORDER — METHYLERGONOVINE MALEATE 0.2 MG/ML
200 INJECTION INTRAVENOUS
Status: DISCONTINUED | OUTPATIENT
Start: 2025-04-05 | End: 2025-04-09 | Stop reason: HOSPADM

## 2025-04-05 RX ORDER — MISOPROSTOL 200 UG/1
400 TABLET ORAL
Status: DISCONTINUED | OUTPATIENT
Start: 2025-04-05 | End: 2025-04-09 | Stop reason: HOSPADM

## 2025-04-05 RX ORDER — SIMETHICONE 80 MG
80 TABLET,CHEWABLE ORAL 4 TIMES DAILY PRN
Status: DISCONTINUED | OUTPATIENT
Start: 2025-04-05 | End: 2025-04-09 | Stop reason: HOSPADM

## 2025-04-05 RX ORDER — FERROUS SULFATE 325(65) MG
325 TABLET ORAL EVERY OTHER DAY
Qty: 30 TABLET | Refills: 0 | Status: SHIPPED | OUTPATIENT
Start: 2025-04-05

## 2025-04-05 RX ORDER — IBUPROFEN 600 MG/1
600 TABLET, FILM COATED ORAL EVERY 6 HOURS PRN
Qty: 60 TABLET | Refills: 0 | Status: SHIPPED | OUTPATIENT
Start: 2025-04-05

## 2025-04-05 RX ORDER — MISOPROSTOL 200 UG/1
800 TABLET ORAL
Status: DISCONTINUED | OUTPATIENT
Start: 2025-04-05 | End: 2025-04-09 | Stop reason: HOSPADM

## 2025-04-05 RX ORDER — NALOXONE HYDROCHLORIDE 0.4 MG/ML
0.2 INJECTION, SOLUTION INTRAMUSCULAR; INTRAVENOUS; SUBCUTANEOUS
Status: DISCONTINUED | OUTPATIENT
Start: 2025-04-05 | End: 2025-04-09 | Stop reason: HOSPADM

## 2025-04-05 RX ORDER — ONDANSETRON 4 MG/1
4 TABLET, ORALLY DISINTEGRATING ORAL EVERY 6 HOURS PRN
Status: DISCONTINUED | OUTPATIENT
Start: 2025-04-05 | End: 2025-04-09 | Stop reason: HOSPADM

## 2025-04-05 RX ORDER — LIDOCAINE 40 MG/G
CREAM TOPICAL
Status: DISCONTINUED | OUTPATIENT
Start: 2025-04-05 | End: 2025-04-09 | Stop reason: HOSPADM

## 2025-04-05 RX ORDER — OXYCODONE HYDROCHLORIDE 5 MG/1
5 TABLET ORAL EVERY 4 HOURS PRN
Status: DISCONTINUED | OUTPATIENT
Start: 2025-04-05 | End: 2025-04-07

## 2025-04-05 RX ORDER — ONDANSETRON 4 MG/1
4 TABLET, ORALLY DISINTEGRATING ORAL EVERY 6 HOURS PRN
Status: DISCONTINUED | OUTPATIENT
Start: 2025-04-05 | End: 2025-04-05

## 2025-04-05 RX ORDER — PROCHLORPERAZINE MALEATE 10 MG
10 TABLET ORAL EVERY 6 HOURS PRN
Status: DISCONTINUED | OUTPATIENT
Start: 2025-04-05 | End: 2025-04-09 | Stop reason: HOSPADM

## 2025-04-05 RX ORDER — LOPERAMIDE HYDROCHLORIDE 2 MG/1
4 CAPSULE ORAL
Status: DISCONTINUED | OUTPATIENT
Start: 2025-04-05 | End: 2025-04-09 | Stop reason: HOSPADM

## 2025-04-05 RX ORDER — ONDANSETRON 2 MG/ML
4 INJECTION INTRAMUSCULAR; INTRAVENOUS EVERY 6 HOURS PRN
Status: DISCONTINUED | OUTPATIENT
Start: 2025-04-05 | End: 2025-04-09 | Stop reason: HOSPADM

## 2025-04-05 RX ORDER — OXYTOCIN/0.9 % SODIUM CHLORIDE 30/500 ML
340 PLASTIC BAG, INJECTION (ML) INTRAVENOUS CONTINUOUS PRN
Status: DISCONTINUED | OUTPATIENT
Start: 2025-04-05 | End: 2025-04-09 | Stop reason: HOSPADM

## 2025-04-05 RX ORDER — MAGNESIUM HYDROXIDE/ALUMINUM HYDROXICE/SIMETHICONE 120; 1200; 1200 MG/30ML; MG/30ML; MG/30ML
15 SUSPENSION ORAL ONCE
Status: COMPLETED | OUTPATIENT
Start: 2025-04-05 | End: 2025-04-05

## 2025-04-05 RX ORDER — OXYTOCIN 10 [USP'U]/ML
10 INJECTION, SOLUTION INTRAMUSCULAR; INTRAVENOUS
Status: DISCONTINUED | OUTPATIENT
Start: 2025-04-05 | End: 2025-04-09 | Stop reason: HOSPADM

## 2025-04-05 RX ORDER — IBUPROFEN 800 MG/1
800 TABLET, FILM COATED ORAL EVERY 6 HOURS
Status: DISCONTINUED | OUTPATIENT
Start: 2025-04-06 | End: 2025-04-05

## 2025-04-05 RX ORDER — DEXTROSE, SODIUM CHLORIDE, SODIUM LACTATE, POTASSIUM CHLORIDE, AND CALCIUM CHLORIDE 5; .6; .31; .03; .02 G/100ML; G/100ML; G/100ML; G/100ML; G/100ML
INJECTION, SOLUTION INTRAVENOUS CONTINUOUS
Status: DISCONTINUED | OUTPATIENT
Start: 2025-04-05 | End: 2025-04-09 | Stop reason: HOSPADM

## 2025-04-05 RX ORDER — AMOXICILLIN 250 MG
2 CAPSULE ORAL 2 TIMES DAILY
Status: DISCONTINUED | OUTPATIENT
Start: 2025-04-05 | End: 2025-04-09 | Stop reason: HOSPADM

## 2025-04-05 RX ORDER — METOCLOPRAMIDE 10 MG/1
10 TABLET ORAL EVERY 6 HOURS PRN
Status: DISCONTINUED | OUTPATIENT
Start: 2025-04-05 | End: 2025-04-09 | Stop reason: HOSPADM

## 2025-04-05 RX ORDER — METOCLOPRAMIDE HYDROCHLORIDE 5 MG/ML
10 INJECTION INTRAMUSCULAR; INTRAVENOUS EVERY 6 HOURS PRN
Status: DISCONTINUED | OUTPATIENT
Start: 2025-04-05 | End: 2025-04-09 | Stop reason: HOSPADM

## 2025-04-05 RX ORDER — AMOXICILLIN 250 MG
1 CAPSULE ORAL DAILY
Qty: 100 TABLET | Refills: 0 | Status: SHIPPED | OUTPATIENT
Start: 2025-04-05

## 2025-04-05 RX ORDER — IBUPROFEN 800 MG/1
800 TABLET, FILM COATED ORAL EVERY 6 HOURS
Status: DISCONTINUED | OUTPATIENT
Start: 2025-04-05 | End: 2025-04-09 | Stop reason: HOSPADM

## 2025-04-05 RX ORDER — HYDROCORTISONE 25 MG/G
CREAM TOPICAL 3 TIMES DAILY PRN
Status: DISCONTINUED | OUTPATIENT
Start: 2025-04-05 | End: 2025-04-09 | Stop reason: HOSPADM

## 2025-04-05 RX ORDER — FUROSEMIDE 20 MG/1
20 TABLET ORAL ONCE
Status: COMPLETED | OUTPATIENT
Start: 2025-04-05 | End: 2025-04-05

## 2025-04-05 RX ORDER — ACETAMINOPHEN 325 MG/1
650 TABLET ORAL EVERY 6 HOURS PRN
Qty: 100 TABLET | Refills: 0 | Status: SHIPPED | OUTPATIENT
Start: 2025-04-05

## 2025-04-05 RX ORDER — CARBOPROST TROMETHAMINE 250 UG/ML
250 INJECTION, SOLUTION INTRAMUSCULAR
Status: DISCONTINUED | OUTPATIENT
Start: 2025-04-05 | End: 2025-04-09 | Stop reason: HOSPADM

## 2025-04-05 RX ORDER — AMOXICILLIN 250 MG
1 CAPSULE ORAL 2 TIMES DAILY
Status: DISCONTINUED | OUTPATIENT
Start: 2025-04-05 | End: 2025-04-09 | Stop reason: HOSPADM

## 2025-04-05 RX ADMIN — ALUMINUM HYDROXIDE, MAGNESIUM HYDROXIDE, AND DIMETHICONE 15 ML: 200; 20; 200 SUSPENSION ORAL at 14:34

## 2025-04-05 RX ADMIN — ACETAMINOPHEN 975 MG: 325 TABLET, FILM COATED ORAL at 23:19

## 2025-04-05 RX ADMIN — OXYCODONE HYDROCHLORIDE 5 MG: 5 TABLET ORAL at 23:19

## 2025-04-05 RX ADMIN — CYCLOBENZAPRINE 10 MG: 10 TABLET, FILM COATED ORAL at 12:48

## 2025-04-05 RX ADMIN — ONDANSETRON 4 MG: 2 INJECTION, SOLUTION INTRAMUSCULAR; INTRAVENOUS at 21:37

## 2025-04-05 RX ADMIN — IBUPROFEN 800 MG: 800 TABLET, FILM COATED ORAL at 21:37

## 2025-04-05 RX ADMIN — ACETAMINOPHEN 975 MG: 325 TABLET, FILM COATED ORAL at 11:40

## 2025-04-05 RX ADMIN — ACETAMINOPHEN 975 MG: 325 TABLET, FILM COATED ORAL at 05:09

## 2025-04-05 RX ADMIN — IBUPROFEN 800 MG: 800 TABLET, FILM COATED ORAL at 15:20

## 2025-04-05 RX ADMIN — SIMETHICONE 80 MG: 80 TABLET, CHEWABLE ORAL at 14:24

## 2025-04-05 RX ADMIN — FLUOXETINE HYDROCHLORIDE 20 MG: 20 CAPSULE ORAL at 10:08

## 2025-04-05 RX ADMIN — KETOROLAC TROMETHAMINE 15 MG: 15 INJECTION, SOLUTION INTRAMUSCULAR; INTRAVENOUS at 02:05

## 2025-04-05 RX ADMIN — OXYCODONE 5 MG: 5 TABLET ORAL at 19:34

## 2025-04-05 RX ADMIN — OXYCODONE 5 MG: 5 TABLET ORAL at 06:43

## 2025-04-05 RX ADMIN — OXYCODONE 5 MG: 5 TABLET ORAL at 12:48

## 2025-04-05 RX ADMIN — SENNOSIDES AND DOCUSATE SODIUM 2 TABLET: 50; 8.6 TABLET ORAL at 19:34

## 2025-04-05 RX ADMIN — ACETAMINOPHEN 975 MG: 325 TABLET, FILM COATED ORAL at 17:51

## 2025-04-05 RX ADMIN — KETOROLAC TROMETHAMINE 15 MG: 15 INJECTION, SOLUTION INTRAMUSCULAR; INTRAVENOUS at 09:13

## 2025-04-05 RX ADMIN — SENNOSIDES AND DOCUSATE SODIUM 1 TABLET: 50; 8.6 TABLET ORAL at 09:12

## 2025-04-05 RX ADMIN — FUROSEMIDE 20 MG: 20 TABLET ORAL at 14:24

## 2025-04-05 ASSESSMENT — ACTIVITIES OF DAILY LIVING (ADL)
ADLS_ACUITY_SCORE: 17
ADLS_ACUITY_SCORE: 24
ADLS_ACUITY_SCORE: 17
ADLS_ACUITY_SCORE: 43
ADLS_ACUITY_SCORE: 17
ADLS_ACUITY_SCORE: 50
ADLS_ACUITY_SCORE: 17

## 2025-04-05 NOTE — PROGRESS NOTES
Brief postpartum note    To bedside for evaluation.     Pt states doing okay, feels sore post surgery. Has not noticed lower extremity swelling. Breathing feels improved from earlier when she was short of breath but she is now having some discomfort with deep inspiration which radiates toward her shoulders. Denies other chest pain. Not feeling lightheaded or dizzy in bed. Working on pumping.     BP 97/68   Pulse 66   Temp (P) 98  F (36.7  C) (Oral)   Resp (P) 16   Wt 98.4 kg (217 lb)   LMP 08/02/2024 (Exact Date)   SpO2 99%   Breastfeeding Unknown    Gen: resting in bed, NAD  CV: regular rate and rhythm  Pulm: CTAB  Ext: no edema     Pt is POD#0 after RLTCS; baby transferred to [facility] NICU postpartum and pt hoping to transfer. This evening has reported some SOB so was evaluated with repeat CBC given significant anemia (hgb 7) pre-op. Postpartum hgb ws improved at >9. She required supplemental O2 for a brief period while sleeping but has not had normal O2 sats on room air for a number of hours. Given pleuritic CP, evaluated w ECG which showed NSR. On exam she is not tachycardic and lungs sound clear, no LE edema. Suspect discomfort may be 2/2 gas pain however will eval further with troponin, CXR prior to transfer. Low suspicion for PE given normal O2 sat, no LE edema, normal vital signs with HR in the 60s. If remainder of work up reassuring, likely safe for transfer tonight.     Sammie Emerson MD  OB/GYN PGY-3  4/4/2025 10:00 PM

## 2025-04-05 NOTE — PLAN OF CARE
Goal Outcome Evaluation: Postpartum VS WNL. C/o sob and chest pain, see the provider notifications. Labs, EKG and chest xray completed, see chart for results. LS clear. Pt states pain is a 4/10, given tylenol and toradol for incisional pain. Pt has dick in place, urine output was adequate. Pts dressing was cdi. Pt tolerating diet, encouraged to drink. Pumping for infant in the NICU. Spouse and mother in room for support. Plan to discharge to [facility] tomorrow when she's feeling better. Continue POC.    Plan of Care Reviewed With: patient, significant other    Overall Patient Progress: no changeOverall Patient Progress: no change

## 2025-04-05 NOTE — PLAN OF CARE
Data: Vital signs within normal limits. Postpartum checks within normal limits - see flow record. Patient eating and drinking normally. Patient able to empty bladder independently and is up ambulating with standby assist. No apparent signs of infection. Patient performing self cares and is able to care for infant.  Action: Patient medicated during the shift for pain. See MAR. Patient reassessed within 1 hour after each medication and pain was improved - patient stated she was comfortable. Patient education done about filling out birth certificate, videos, and EDS. See flow record.  Response: Patient reporting continued chest tightness that is worse when she wakes up. Oxygen sats WNL, RR WNL, breathing comfortably.  Providers aware and running tests.  Support persons ( and mother) present.   Plan: Continue with supportive cares and monitoring for changes.

## 2025-04-05 NOTE — PLAN OF CARE
"Goal Outcome Evaluation:      Plan of Care Reviewed With: patient    Overall Patient Progress: no changeOverall Patient Progress: no change    VSS and postpartum assessments WDL. Pt c/o chest pain/tightness when taking deep breaths (provider aware). Up SBA and independent with cares - endorses some dizziness when standing and walking.  Frequent pumping encouraged, but pt too tired overnight to pump.  Pain managed with Tylenol, Toradol, and Oxycodone.  Spouse and mother present and supportive.  Continue with postpartum cares and education per plan of care.       Problem: Adult Inpatient Plan of Care  Goal: Plan of Care Review  Description: The Plan of Care Review/Shift note should be completed every shift.  The Outcome Evaluation is a brief statement about your assessment that the patient is improving, declining, or no change.  This information will be displayed automatically on your shiftnote.  Outcome: Progressing  Flowsheets (Taken 4/5/2025 0647)  Plan of Care Reviewed With: patient  Overall Patient Progress: no change  Goal: Patient-Specific Goal (Individualized)  Description: You can add care plan individualizations to a care plan. Examples of Individualization might be:  \"Parent requests to be called daily at 9am for status\", \"I have a hard time hearing out of my right ear\", or \"Do not touch me to wake me up as it startlesme\".  Outcome: Progressing  Goal: Absence of Hospital-Acquired Illness or Injury  Outcome: Progressing  Intervention: Prevent Skin Injury  Recent Flowsheet Documentation  Taken 4/5/2025 0055 by Marielena Willoughby, RN  Body Position: position changed independently  Intervention: Prevent and Manage VTE (Venous Thromboembolism) Risk  Recent Flowsheet Documentation  Taken 4/5/2025 0055 by Marielena Willoughby, RN  VTE Prevention/Management: SCDs on (sequential compression devices)  Intervention: Prevent Infection  Recent Flowsheet Documentation  Taken 4/5/2025 0055 by Marielena Willoughby, " RN  Infection Prevention:   equipment surfaces disinfected   hand hygiene promoted   personal protective equipment utilized   rest/sleep promoted  Goal: Optimal Comfort and Wellbeing  Outcome: Progressing  Intervention: Provide Person-Centered Care  Recent Flowsheet Documentation  Taken 2025 by Marielena Willoughby RN  Trust Relationship/Rapport:   care explained   choices provided   questions answered   questions encouraged   reassurance provided   thoughts/feelings acknowledged  Goal: Readiness for Transition of Care  Outcome: Progressing     Problem: Postpartum ( Delivery)  Goal: Successful Parent Role Transition  Outcome: Progressing  Goal: Hemostasis  Outcome: Progressing  Goal: Effective Bowel Elimination  Outcome: Progressing  Goal: Fluid and Electrolyte Balance  Outcome: Progressing  Goal: Absence of Infection Signs and Symptoms  Outcome: Progressing  Intervention: Prevent or Manage Infection  Recent Flowsheet Documentation  Taken 2025 by Marielena Willoughby, RN  Infection Management: aseptic technique maintained  Goal: Anesthesia/Sedation Recovery  Outcome: Progressing  Goal: Optimal Pain Control and Function  Outcome: Progressing  Goal: Nausea and Vomiting Relief  Outcome: Progressing  Goal: Effective Urinary Elimination  Outcome: Progressing  Goal: Effective Oxygenation and Ventilation  Outcome: Progressing  Intervention: Optimize Oxygenation and Ventilation  Recent Flowsheet Documentation  Taken 2025 by Marielena Willoughby RN  Head of Bed (HOB) Positioning: HOB at 20-30 degrees

## 2025-04-05 NOTE — PROVIDER NOTIFICATION
04/05/25 0013   Provider Notification   Provider Name/Title Krishna/GAVIOTA   Method of Notification Phone  (text)   Request Evaluate-Remote   Notification Reason Lab Results     FYI - preliminary X-ray results in

## 2025-04-05 NOTE — PROGRESS NOTES
"Obstetrics Postpartum Progress Note  DOS: 25  MRN: [id number]    POD#1 after RLTCS     S: Patient states she continues to have SOB this morning. Slept overnight per RN, but woke up this morning and again felt discomfort in the center of her chest with deep inspiration.  Abdominal pain continues but is tolerable with current pain meds. Lochia appropriate.  Eating and drinking without nausea/vomiting.  She is  passing flatus. Not currently feeling lightheaded or dizzy.  Pinto removed early this morning and she has not yet voided. Denies headaches, vision changes. Hoping to transfer to be closer to her baby.   O:  Vitals:    25 2000 25 2100 25 0055 25 0505   BP: 97/68 109/62 105/68 106/74   BP Location:   Left arm Left arm   Patient Position:   Semi-Gómez's Semi-Gómez's   Cuff Size:   Adult Large Adult Large   Pulse:       Resp:   23 22   Temp:   98  F (36.7  C) 98.2  F (36.8  C)   TempSrc:   Oral Oral   SpO2: 99% 96% 97% 98%   Weight:         Gen:  NAD, resting in bed  CV: Regular rate, well perfused  Resp: Nonlabored on room air, clear to auscultation bilateral lower lung fields, normal inspiratory effort  Abd: soft, nondistended, nontender, fundus firm at umbilicus  Incision:  clean, dry, intact, bandage in place  Ext: 1+ edema    Urine output:     Intake/Output Summary (Last 24 hours) at 2025 0707  Last data filed at 2025 0625  Gross per 24 hour   Intake 2950 ml   Output 2142 ml   Net 808 ml        Weight:   Vitals:    25 0307   Weight: 98.4 kg (217 lb)         Labs:  Hemoglobin   Date Value Ref Range Status   2025 8.8 (L) 11.7 - 15.7 g/dL Final   2025 9.2 (L) 11.7 - 15.7 g/dL Final   2013 12.5 11.7 - 15.7 g/dL Final       No results found for: \"RUQIGG\"    Assessment and Plan: 25 year old  POD#1 s/p RLTCS and IUD, doing well postpartum. Pregnancy was complicated by hep B NI, h/o CS, anxiety and depression. Pt with pleuritic chest pain the " postpartum period, work up notable for preliminary XR read c/f pulmonary edema; pt saturations remain >96% on room air and other VS wnl. Will consider diuresis today but awaiting final CXR read and BNP added this AM.     Routine postpartum:  Heme: Hgb 7.6 > 2u pRBC >  (txa)> >9.0 AM hgb pending. No symptoms of ABLA. Will discharge home w/ PO iron.  Pain: well-controlled with tylenol, ibuprofen and oxycodone prn, continue.  Rh positive/Rubella immune. No intervention required.  Feeding: pumping for baby in NICU   :  s/p dick, awaiting first void  PPX: Encourage ambulation  Continue regular diet, scheduled bowel regimen, prn antiemetics  Contraception: not discussed     Hep B NI  - offer vaccination postpartum    Pleuritic chest pain, improved  - ECG NSR  - troponin neg  - f/up BNP  - CXR with prelim read c/f pulmonary edema; follow up final read, consider lasix however pt with normal O2 sats on room air through the night and has reassuring exam   - consider CTPE, echocardiogram for further work up if necessary pending the above results; low clinical suspicion at this time for PE given normal HR, O2 sat, and clinical exam though she is at higher risk for this in the early postpartum period   - VSS, O2 sat wnl on rom air     NORBERTO/MDD  - PTA fluoxetine, PRN hydroxyzine    Dispo: Anticipate transfer to [facility] today pending above work up and improvement in pleuritic CP     Sammie Emerson MD  OB/GYN PGY-3  4/5/2025 7:10 AM       I have seen and examined the patient separate from the resident. I have reviewed and agree with the note. She has ongoing pleuritic chest pain but no hypoxia or dyspnea. CXR with pulmonary edema vs atelectasis and borderline cardiomegaly. Serial troponin neg, will get echo and consider CT PE, though suspicion is low given normal vital signs.  Moira Martinez MD

## 2025-04-05 NOTE — PROVIDER NOTIFICATION
04/04/25 2049   Provider Notification   Provider Name/Title Dr. Emerson   Method of Notification Electronic Page   Request Evaluate-Remote   Notification Reason Status Update  (notified the G3 that the pt is having dyspnea, would you like EKG?)     Paged the G3 that the pt is having dyspnea, would you like EKG?  Provider ordered the EKG.   EKG completed- notified of hte normal sinus rhythm and normal EKG results.

## 2025-04-05 NOTE — PROVIDER NOTIFICATION
04/04/25 1025   Provider Notification   Provider Name/Title Dr. Emerson   Method of Notification Electronic Page   Request Evaluate-Remote   Notification Reason Status Update  (informed  that the pt will have a portable xray done)     Updated Dr. Emerson that the pt will be having a portable chest xray done. Will update her when her chest xray is complete.

## 2025-04-05 NOTE — PLAN OF CARE
Goal Outcome Evaluation:      Plan of Care Reviewed With: patient    Overall Patient Progress: improvingOverall Patient Progress: improving    Outcome Evaluation: plan to transfer to [facility] to be near baby    Data: Vital signs within normal limits. Lasix given and GI cocktail given - pt reporting some relief with shortness of breath but overall stated it hasn't gotten worse. Lung sounds clear. Postpartum checks within normal limits - see flow record. Patient eating and drinking normally. Patient able to empty bladder independently and is up ambulating. No apparent signs of infection. Incision healing well. Patient performing self cares and pumping for infant in NICU.   Action: Patient medicated during the shift for pain. See MAR. Patient reassessed within 1 hour after each medication and pain was improved - patient stated she was comfortable. Patient education done about pumping to increase supply, pain control. See flow record.  Response: Positive attachment behaviors observed with infant. Support persons Mahendra present.   Plan: Anticipate transfer to [facility] after 1900.    FYI: Pt is working on birth certificate but may need to fax back to us as they have not decided a name and having a hard time choosing one.

## 2025-04-05 NOTE — PROVIDER NOTIFICATION
04/05/25 0508   Provider Notification   Provider Name/Title Ki/MACIE   Method of Notification Phone  (text)   Request Evaluate-Remote   Notification Reason Status Update     Pt just woke up and states she's having a hard time breathing, says it's tighter and hurts more to breathe than before, VSS and lungs sound clear. Do you want to assess?    Per Dr. Emerson - Will come to assess when able to. Notify if any changes to VS

## 2025-04-06 LAB — HGB BLD-MCNC: 9.5 G/DL (ref 11.7–15.7)

## 2025-04-06 PROCEDURE — 250N000011 HC RX IP 250 OP 636: Performed by: OBSTETRICS & GYNECOLOGY

## 2025-04-06 PROCEDURE — 36415 COLL VENOUS BLD VENIPUNCTURE: CPT | Performed by: OBSTETRICS & GYNECOLOGY

## 2025-04-06 PROCEDURE — 120N000013 HC R&B IMCU

## 2025-04-06 PROCEDURE — 250N000013 HC RX MED GY IP 250 OP 250 PS 637: Performed by: OBSTETRICS & GYNECOLOGY

## 2025-04-06 PROCEDURE — 85018 HEMOGLOBIN: CPT | Performed by: OBSTETRICS & GYNECOLOGY

## 2025-04-06 RX ADMIN — ACETAMINOPHEN 975 MG: 325 TABLET, FILM COATED ORAL at 23:55

## 2025-04-06 RX ADMIN — FLUOXETINE HYDROCHLORIDE 20 MG: 20 CAPSULE ORAL at 09:08

## 2025-04-06 RX ADMIN — OXYCODONE HYDROCHLORIDE 5 MG: 5 TABLET ORAL at 07:13

## 2025-04-06 RX ADMIN — OXYCODONE HYDROCHLORIDE 5 MG: 5 TABLET ORAL at 16:07

## 2025-04-06 RX ADMIN — IBUPROFEN 800 MG: 800 TABLET, FILM COATED ORAL at 03:11

## 2025-04-06 RX ADMIN — OXYCODONE HYDROCHLORIDE 5 MG: 5 TABLET ORAL at 03:12

## 2025-04-06 RX ADMIN — ACETAMINOPHEN 975 MG: 325 TABLET, FILM COATED ORAL at 11:35

## 2025-04-06 RX ADMIN — ACETAMINOPHEN 975 MG: 325 TABLET, FILM COATED ORAL at 04:53

## 2025-04-06 RX ADMIN — SENNOSIDES AND DOCUSATE SODIUM 2 TABLET: 50; 8.6 TABLET ORAL at 09:08

## 2025-04-06 RX ADMIN — IBUPROFEN 800 MG: 800 TABLET, FILM COATED ORAL at 21:13

## 2025-04-06 RX ADMIN — SENNOSIDES AND DOCUSATE SODIUM 2 TABLET: 50; 8.6 TABLET ORAL at 21:13

## 2025-04-06 RX ADMIN — OXYCODONE HYDROCHLORIDE 5 MG: 5 TABLET ORAL at 23:54

## 2025-04-06 RX ADMIN — IBUPROFEN 800 MG: 800 TABLET, FILM COATED ORAL at 15:43

## 2025-04-06 RX ADMIN — ONDANSETRON 4 MG: 4 TABLET, ORALLY DISINTEGRATING ORAL at 05:08

## 2025-04-06 RX ADMIN — IBUPROFEN 800 MG: 800 TABLET, FILM COATED ORAL at 09:08

## 2025-04-06 RX ADMIN — OXYCODONE HYDROCHLORIDE 5 MG: 5 TABLET ORAL at 19:59

## 2025-04-06 RX ADMIN — ACETAMINOPHEN 975 MG: 325 TABLET, FILM COATED ORAL at 17:46

## 2025-04-06 ASSESSMENT — ACTIVITIES OF DAILY LIVING (ADL)
ADLS_ACUITY_SCORE: 24

## 2025-04-06 NOTE — PLAN OF CARE
Pt transferred from the [facility] at . Vital signs stable. Postpartum checks WDL. C/s dressing CDI, no signs of infection noted in surrounding tissue. Will remove dressing today. Pt is up ad linda, voiding w/o difficulties. Pt is independent in self cares. Pt is pumping for  in NICU. Pain well controlled with Tylenol, Ibuprofen, PRN Oxycodone, and heat. Pt stated increased comfort after each medication. NICU Ipad setup in room. Visiting  in NICU frequently. Positive bonding and frequent holding observed. FOB at bedside, supportive of pt.        EPDS score 14 - answered no to last question. SW consult in. Pt has the birth certificate as they have not chosen a name for baby girl. Will fax back to the  when decided.              Goal Outcome Evaluation:      Plan of Care Reviewed With: patient    Overall Patient Progress: improvingOverall Patient Progress: improving      Problem: Adult Inpatient Plan of Care  Goal: Plan of Care Review  Description: The Plan of Care Review/Shift note should be completed every shift.  The Outcome Evaluation is a brief statement about your assessment that the patient is improving, declining, or no change.  This information will be displayed automatically on your shiftnote.  Outcome: Progressing  Flowsheets (Taken 2025)  Plan of Care Reviewed With: patient  Overall Patient Progress: improving  Goal: Absence of Hospital-Acquired Illness or Injury  Intervention: Prevent Skin Injury  Recent Flowsheet Documentation  Taken 2025 by Nancy Sin, RN  Body Position: position changed independently  Intervention: Prevent Infection  Recent Flowsheet Documentation  Taken 2025 by Nancy Sin, ROSEANNA  Infection Prevention:   hand hygiene promoted   rest/sleep promoted   single patient room provided  Goal: Optimal Comfort and Wellbeing  Intervention: Monitor Pain and Promote Comfort  Recent Flowsheet Documentation  Taken 2025 by Nancy Sin, RN  Pain  Management Interventions:   medication (see MAR)   heat applied  Intervention: Provide Person-Centered Care  Recent Flowsheet Documentation  Taken 2025 by Nancy Sin RN  Trust Relationship/Rapport:   care explained   choices provided   empathic listening provided   emotional support provided   questions answered   questions encouraged   reassurance provided   thoughts/feelings acknowledged     Problem: Postpartum ( Delivery)  Goal: Effective Bowel Elimination  Intervention: Enhance Bowel Motility and Elimination  Recent Flowsheet Documentation  Taken 2025 by Nancy Sin RN  Bowel Elimination Promotion:   adequate fluid intake promoted   ambulation promoted  Goal: Optimal Pain Control and Function  Intervention: Prevent or Manage Pain  Recent Flowsheet Documentation  Taken 2025 by Nancy Sin RN  Pain Management Interventions:   medication (see MAR)   heat applied  Perineal Care:   perineal hygiene encouraged   perineal spray bottle/warm water use encouraged  Goal: Nausea and Vomiting Relief  Intervention: Prevent or Manage Nausea and Vomiting  Recent Flowsheet Documentation  Taken 2025 by Nancy Sin RN  Nausea/Vomiting Interventions:   antiemetic   sips of clear liquids given  Goal: Effective Oxygenation and Ventilation  Intervention: Optimize Oxygenation and Ventilation  Recent Flowsheet Documentation  Taken 2025 by Nancy Sin RN  Head of Bed (HOB) Positioning: HOB at 45 degrees

## 2025-04-06 NOTE — CONSULTS
"Copied from mother's chart    [facility]  MATERNAL CHILD HEALTH   INITIAL PSYCHOSOCIAL ASSESSMENT      DATA:      Reason for Social Work Consult: NICU admission      Presenting Information: JOANA met with Charly at bedside to introduce role, offer support and complete initial assessment. Charly's sister was also present and supportive.      Living Situation/Family Constellation: Charly lives in [place] with her partner Mahendra, and their 4 children ranging in age from 1-19.      Social Support: Charly shared that shared that she has a good support system of family..\     Employment/financial support: No concerns reported     Insurance:      Mental Health History: denied any hx of mental health difficulties     History of Postpartum Mood Disorders: Charly shared that she has experienced PMAD symptoms to varying degrees after the birth of her other children. She experienced symptoms requiring inpatient hospitalization \"for a couple days\" after the birth of her 9-year-old. She treated her symptoms for subsequent children with a combination of medications and therapy, although she acknowledges she struggles to take her medications consistently. Charly shared that she has a current prescription for mental health meds but can't say how well they are working as she has not been consistent taking them.      Chemical Health History: no concerns reported     Community Resources/PHN/WIC independent in accessing services, denied any needs         //Baby Supplies: Charly shared that she has everything she needs for baby at home        INTERVENTION:      JOANA completed chart review and collaborated with the multidisciplinary team.   Psychosocial Assessment   Introduction to Maternal Child Health  role and scope of practice   Reviewed Hospital and Community Resources   Assessed Mental Health History and Current Symptoms   Identified stressors, barriers and family concerns   Provided support and active " "empathetic listening and validation.   Provided psychoeducation on  mood and anxiety disorders, assessed for any current symptoms or history  Provided brochure Depression and Anxiety During and after Pregnancy.      ASSESSMENT:      Coping: adequate     Affect: appropriate     Mood: appropriate     Motivation/Ability to Access Services:  motivated, independent in accessing services     Assessment of Support System: stable, involved     Level of engagement with SW: Engaged and appropriate.      Family and parent/infant interactions:  Charly interacted warmly with her baby and sister and was responsive to baby's cues during this encounter.     Assessment of parental risk for PMAD:      Higher than average risk given hx of PMAD, NICU admission     Strengths: caring family, is treating her mental health with therapy and meds (though these meds are not currently being taken consistently)      Vulnerabilities: Struggles to take meds consistently      Identified Barriers:   none identified at this time     PLAN:      SW met with Charly at bedside to introduce role and offer psychosocial assessment. Charly's sister was also present and supportive. Charly's  had stepped out.      Charly lives in [place] with her partner Mahendra, and their 4 children ranging in age from 1-19. Charly shared that shared that she has a good support system of family. Denied any current social work needs but welcomed ongoing check-ins.      Charly shared that she has experienced PMAD symptoms to varying degrees after the birth of her other children. She experienced symptoms requiring inpatient hospitalization \"for a couple days\" after the birth of her 9-year-old. She treated her symptoms for subsequent children with a combination of medications and therapy, although she acknowledges she struggles to take her medications consistently. Charly shared that she has a current prescription for mental health meds but can't say how well they are " working as she has not been consistent taking them.      SW discussed how to get help if symptoms arise again and shared  Depression and Anxiety During and after Pregnancy brochure.      Reviewed necessity to add baby to insurance within 30 days.     BECCA Monaco, [facility]   Care Coordinator-Casual  [email or web address]

## 2025-04-06 NOTE — PROVIDER NOTIFICATION
FYI: pt transferred to [facility] at 1955, EDS was 14, 0 to question 10, called [facility] and they will put social work consult in.

## 2025-04-06 NOTE — LACTATION NOTE
Lactation follow-up : Hospital grade pump prescription given to pt . Mother has been pumping occasionally was concerned that he milk has not come in yet. Discussed the importance of frequent stimulation via pumping nad hand expression every 3 hours. Reviewed Premature infants, blood loss and stress and how they can impact milk supply. She was able to pump some colostrum. Questions answered and encouragement given.

## 2025-04-06 NOTE — PLAN OF CARE
"  Problem: Adult Inpatient Plan of Care  Goal: Plan of Care Review  Description: The Plan of Care Review/Shift note should be completed every shift.  The Outcome Evaluation is a brief statement about your assessment that the patient is improving, declining, or no change.  This information will be displayed automatically on your shiftnote.  Outcome: Progressing  Flowsheets (Taken 4/6/2025 4499)  Outcome Evaluation: Charly is doing well - Pain controlled with medications per pt report-  Plan of Care Reviewed With:   patient   spouse  Overall Patient Progress: improving  Goal: Patient-Specific Goal (Individualized)  Description: You can add care plan individualizations to a care plan. Examples of Individualization might be:  \"Parent requests to be called daily at 9am for status\", \"I have a hard time hearing out of my right ear\", or \"Do not touch me to wake me up as it startlesme\".  Outcome: Progressing  Goal: Absence of Hospital-Acquired Illness or Injury  Outcome: Progressing  Intervention: Prevent Skin Injury  Recent Flowsheet Documentation  Taken 4/6/2025 1607 by Eduarda Scott RN  Body Position: position changed independently  Intervention: Prevent and Manage VTE (Venous Thromboembolism) Risk  Recent Flowsheet Documentation  Taken 4/6/2025 1607 by Eduarda Scott RN  VTE Prevention/Management: foot pump device off  Intervention: Prevent Infection  Recent Flowsheet Documentation  Taken 4/6/2025 1607 by Eduarda Scott RN  Infection Prevention:   hand hygiene promoted   rest/sleep promoted  Goal: Optimal Comfort and Wellbeing  Outcome: Progressing  Intervention: Monitor Pain and Promote Comfort  Recent Flowsheet Documentation  Taken 4/6/2025 1607 by Eduarda Scott RN  Pain Management Interventions: medication (see MAR)  Intervention: Provide Person-Centered Care  Recent Flowsheet Documentation  Taken 4/6/2025 1607 by Eduarda Scott RN  Trust Relationship/Rapport:   care explained   choices provided   " questions answered   questions encouraged   thoughts/feelings acknowledged  Goal: Readiness for Transition of Care  Outcome: Progressing   Goal Outcome Evaluation:      Plan of Care Reviewed With: patient, spouse    Overall Patient Progress: improvingOverall Patient Progress: improving    Outcome Evaluation: Charly is doing well - Pain controlled with medications per pt report-

## 2025-04-06 NOTE — PROGRESS NOTES
Pt to be transferred to [facility] via EMS. Report given to ROSEANNA Mckinney at 1923.   Care to be transferred to Amy CONDON here at [facility] and will ensure safe transport of patient to [facility].

## 2025-04-06 NOTE — LACTATION NOTE
Lactation in to see patient briefly. Patient trying to rest. Discussed that writer did obtain an order for a hospital grade pump. Will call when awake. Order placed on front of chart.

## 2025-04-06 NOTE — PROVIDER NOTIFICATION
04/05/25 2040   Provider Notification   Provider Name/Title Dr. WILMER Suggs   Method of Notification Electronic Page   Request Evaluate-Remote   Notification Reason Other;Medication Request     Paged MD to get PP orders and zofran for nausea. EPDS screen 14 - answered no to last question. SW consult in. Pt stable after transfer. Pt still c/o of chest pain/tightness but stated it has improved and is not getting worse. Incentive spirometer given. MD put orders in.

## 2025-04-06 NOTE — PROGRESS NOTES
[facility] Obstetrics Post-Op / Progress Note         Assessment and Plan:    Assessment:   Post-operative day #2  Low transverse repeat  section  Paraguard IUD removal  L&D complications:  labor with Paraguard IUD in place delivered by [facility] at [facility]  SABINA last night to [facility] due to baby being transferred to out NICU      Doing well.  Clean wound without signs of infection.  No immediate surgical complications identified.  No excessive bleeding  Pain well-controlled.  Hgb 9.5      Plan:   Ambulation encouraged  Pain control measures as needed  Reportable signs and symptoms dicussed with the patient  Start iron supplementation  Anticipate discharge in 1-2 days           Interval History:   Doing well.  Pain is well-controlled.  No fevers.  No history of wound drainage, warmth or significant erythema.  Good appetite.  Denies chest pain, shortness of breath, nausea or vomiting.  Ambulatory.  Breastfeeding/pumping          Significant Problems:      Past Medical History:   Diagnosis Date    NO ACTIVE PROBLEMS              Review of Systems:    The patient denies any chest pain, shortness of breath, excessive pain, fever, chills, purulent drainage from the wound, nausea or vomiting.          Medications:   All medications related to the patient's surgery have been reviewed          Physical Exam:   All vitals stable  Patient Vitals for the past 24 hrs:   BP Temp Temp src Pulse Resp   25 0310 117/84 98.3  F (36.8  C) Oral 78 16   25 2100 121/72 98.2  F (36.8  C) Oral 92 16     Wound clean and dry with minimal or no drainage.  Surrounding skin with minimal erythema.  Ext NT          Data:   All laboratory data related to this surgery reviewed  Hemoglobin   Date Value Ref Range Status   2025 9.5 (L) 11.7 - 15.7 g/dL Final   2025 9.0 (L) 11.7 - 15.7 g/dL Final   2025 8.8 (L) 11.7 - 15.7 g/dL Final   2025 9.2 (L) 11.7 - 15.7 g/dL Final   2025 7.6 (L) 11.7 - 15.7  g/dL Final   05/31/2013 12.5 11.7 - 15.7 g/dL Final         Russell Maki MD  4/6/2025 8:12 AM

## 2025-04-06 NOTE — PROGRESS NOTES
Accepting transfer from the [facility]  Charly is a 25 year old from [place] delivering by residents through the [facility] at the [facility].  Baby was transferred to [facility] NICU for available beds and mom was worked up with negative findings for chest pain, including neg echo, negative troponins.  Pt transferred to  [facility] to be close to her baby.

## 2025-04-06 NOTE — PLAN OF CARE
"Pt's vitals stable. Fundus firm and midline. Denies difficulty voiding. Incision with steri-strips. Pain controlled with oral medications and heating pad. Independent with self cares. Pumping and visiting baby in NICU.     Problem: Adult Inpatient Plan of Care  Goal: Plan of Care Review  Description: The Plan of Care Review/Shift note should be completed every shift.  The Outcome Evaluation is a brief statement about your assessment that the patient is improving, declining, or no change.  This information will be displayed automatically on your shiftnote.  Outcome: Progressing  Flowsheets (Taken 4/6/2025 1435)  Outcome Evaluation: Patient stable.  Plan of Care Reviewed With: patient  Overall Patient Progress: improving  Goal: Patient-Specific Goal (Individualized)  Description: You can add care plan individualizations to a care plan. Examples of Individualization might be:  \"Parent requests to be called daily at 9am for status\", \"I have a hard time hearing out of my right ear\", or \"Do not touch me to wake me up as it startlesme\".  Outcome: Progressing  Goal: Absence of Hospital-Acquired Illness or Injury  Outcome: Progressing  Intervention: Prevent Skin Injury  Recent Flowsheet Documentation  Taken 4/6/2025 0906 by Catia Carr, RN  Body Position: position changed independently  Intervention: Prevent Infection  Recent Flowsheet Documentation  Taken 4/6/2025 0906 by Catia Carr RN  Infection Prevention:   hand hygiene promoted   rest/sleep promoted  Goal: Optimal Comfort and Wellbeing  Outcome: Progressing  Intervention: Monitor Pain and Promote Comfort  Recent Flowsheet Documentation  Taken 4/6/2025 0908 by Catia Carr, RN  Pain Management Interventions: medication (see MAR)  Intervention: Provide Person-Centered Care  Recent Flowsheet Documentation  Taken 4/6/2025 0906 by Catia Carr, RN  Trust Relationship/Rapport:   care explained   choices provided   questions answered   questions " encouraged   thoughts/feelings acknowledged  Goal: Readiness for Transition of Care  Outcome: Progressing     Goal Outcome Evaluation:    Plan of Care Reviewed With: patient  Overall Patient Progress: improving  Outcome Evaluation: Patient stable.

## 2025-04-06 NOTE — PROVIDER NOTIFICATION
04/05/25 0153   Provider Notification   Provider Name/Title Dr. WIMLER Suggs   Method of Notification Electronic Page   Request Evaluate-Remote   Notification Reason Medication Request     Paged MD to ask for pt Prozac 20 mg to be ordered for the AM. VORB.

## 2025-04-06 NOTE — PLAN OF CARE
VSS. Patient ambulating and independent with cares, pumping for . Patient able to void without difficulty. Oxycodone given for pain, also taking Tylenol and Ibuprofen. EDS scored 14, [facility] was called and updated, would put in social work consult, BC with parents and will fax a copy as soon as they have decided a name for . Patient transferred to [facility] at 1955 via stretcher accompanied by ambulance staff.

## 2025-04-07 LAB
ATRIAL RATE - MUSE: 66 BPM
DIASTOLIC BLOOD PRESSURE - MUSE: NORMAL MMHG
INTERPRETATION ECG - MUSE: NORMAL
P AXIS - MUSE: 20 DEGREES
PR INTERVAL - MUSE: 176 MS
QRS DURATION - MUSE: 86 MS
QT - MUSE: 378 MS
QTC - MUSE: 396 MS
R AXIS - MUSE: -18 DEGREES
SYSTOLIC BLOOD PRESSURE - MUSE: NORMAL MMHG
T AXIS - MUSE: 9 DEGREES
VENTRICULAR RATE- MUSE: 66 BPM

## 2025-04-07 PROCEDURE — 250N000013 HC RX MED GY IP 250 OP 250 PS 637: Performed by: OBSTETRICS & GYNECOLOGY

## 2025-04-07 PROCEDURE — 90715 TDAP VACCINE 7 YRS/> IM: CPT | Performed by: OBSTETRICS & GYNECOLOGY

## 2025-04-07 PROCEDURE — 250N000011 HC RX IP 250 OP 636: Performed by: OBSTETRICS & GYNECOLOGY

## 2025-04-07 PROCEDURE — 120N000013 HC R&B IMCU

## 2025-04-07 PROCEDURE — 90471 IMMUNIZATION ADMIN: CPT | Performed by: OBSTETRICS & GYNECOLOGY

## 2025-04-07 RX ORDER — HYDROXYZINE HYDROCHLORIDE 25 MG/1
25 TABLET, FILM COATED ORAL EVERY 6 HOURS PRN
Status: DISCONTINUED | OUTPATIENT
Start: 2025-04-07 | End: 2025-04-09 | Stop reason: HOSPADM

## 2025-04-07 RX ORDER — HYDROXYZINE HYDROCHLORIDE 50 MG/1
50 TABLET, FILM COATED ORAL EVERY 6 HOURS PRN
Status: DISCONTINUED | OUTPATIENT
Start: 2025-04-07 | End: 2025-04-09 | Stop reason: HOSPADM

## 2025-04-07 RX ORDER — LIDOCAINE 4 G/G
1 PATCH TOPICAL
Status: DISCONTINUED | OUTPATIENT
Start: 2025-04-07 | End: 2025-04-09 | Stop reason: HOSPADM

## 2025-04-07 RX ORDER — OXYCODONE HYDROCHLORIDE 5 MG/1
5-10 TABLET ORAL EVERY 4 HOURS PRN
Status: DISCONTINUED | OUTPATIENT
Start: 2025-04-07 | End: 2025-04-09 | Stop reason: HOSPADM

## 2025-04-07 RX ADMIN — SENNOSIDES AND DOCUSATE SODIUM 1 TABLET: 50; 8.6 TABLET ORAL at 09:56

## 2025-04-07 RX ADMIN — FLUOXETINE HYDROCHLORIDE 20 MG: 20 CAPSULE ORAL at 09:56

## 2025-04-07 RX ADMIN — IBUPROFEN 800 MG: 800 TABLET, FILM COATED ORAL at 09:56

## 2025-04-07 RX ADMIN — ACETAMINOPHEN 975 MG: 325 TABLET, FILM COATED ORAL at 05:52

## 2025-04-07 RX ADMIN — OXYCODONE HYDROCHLORIDE 10 MG: 5 TABLET ORAL at 22:18

## 2025-04-07 RX ADMIN — OXYCODONE HYDROCHLORIDE 5 MG: 5 TABLET ORAL at 09:56

## 2025-04-07 RX ADMIN — LIDOCAINE 1 PATCH: 4 PATCH TOPICAL at 11:49

## 2025-04-07 RX ADMIN — ACETAMINOPHEN 975 MG: 325 TABLET, FILM COATED ORAL at 20:48

## 2025-04-07 RX ADMIN — OXYCODONE HYDROCHLORIDE 5 MG: 5 TABLET ORAL at 11:17

## 2025-04-07 RX ADMIN — OXYCODONE HYDROCHLORIDE 5 MG: 5 TABLET ORAL at 03:53

## 2025-04-07 RX ADMIN — CLOSTRIDIUM TETANI TOXOID ANTIGEN (FORMALDEHYDE INACTIVATED), CORYNEBACTERIUM DIPHTHERIAE TOXOID ANTIGEN (FORMALDEHYDE INACTIVATED), BORDETELLA PERTUSSIS TOXOID ANTIGEN (GLUTARALDEHYDE INACTIVATED), BORDETELLA PERTUSSIS FILAMENTOUS HEMAGGLUTININ ANTIGEN (FORMALDEHYDE INACTIVATED), BORDETELLA PERTUSSIS PERTACTIN ANTIGEN, AND BORDETELLA PERTUSSIS FIMBRIAE 2/3 ANTIGEN 0.5 ML: 5; 2; 2.5; 5; 3; 5 INJECTION, SUSPENSION INTRAMUSCULAR at 10:14

## 2025-04-07 RX ADMIN — OXYCODONE HYDROCHLORIDE 10 MG: 5 TABLET ORAL at 15:54

## 2025-04-07 RX ADMIN — SENNOSIDES AND DOCUSATE SODIUM 2 TABLET: 50; 8.6 TABLET ORAL at 20:48

## 2025-04-07 RX ADMIN — ACETAMINOPHEN 975 MG: 325 TABLET, FILM COATED ORAL at 11:49

## 2025-04-07 RX ADMIN — HYDROXYZINE HYDROCHLORIDE 25 MG: 25 TABLET, FILM COATED ORAL at 11:17

## 2025-04-07 RX ADMIN — IBUPROFEN 800 MG: 800 TABLET, FILM COATED ORAL at 22:19

## 2025-04-07 RX ADMIN — IBUPROFEN 800 MG: 800 TABLET, FILM COATED ORAL at 15:54

## 2025-04-07 RX ADMIN — IBUPROFEN 800 MG: 800 TABLET, FILM COATED ORAL at 03:53

## 2025-04-07 ASSESSMENT — ACTIVITIES OF DAILY LIVING (ADL)
ADLS_ACUITY_SCORE: 24
ADLS_ACUITY_SCORE: 30
ADLS_ACUITY_SCORE: 24
ADLS_ACUITY_SCORE: 30
ADLS_ACUITY_SCORE: 24
ADLS_ACUITY_SCORE: 30
ADLS_ACUITY_SCORE: 24
ADLS_ACUITY_SCORE: 30
ADLS_ACUITY_SCORE: 24

## 2025-04-07 NOTE — PLAN OF CARE
Vital signs stable. Postpartum checks WDL. C/s incision with scant amount of dried drainage. No signs of infection noted in surrounding tissue. Pt is up ad linda, voiding w/o difficulties. Pt is independent in self cares. Pt is pumping for  in NICU. Encouraging to pump every 3 hours. Pain well controlled with Tylenol, Ibuprofen, PRN Oxycodone, and heat. Pt stated increased comfort after each medication. Pt is attentive to all  cues and cares. Positive bonding and frequent holding observed. FOB at bedside, supportive of pt.                          Goal Outcome Evaluation:      Plan of Care Reviewed With: patient, spouse    Overall Patient Progress: improvingOverall Patient Progress: improving      Problem: Adult Inpatient Plan of Care  Goal: Plan of Care Review  Description: The Plan of Care Review/Shift note should be completed every shift.  The Outcome Evaluation is a brief statement about your assessment that the patient is improving, declining, or no change.  This information will be displayed automatically on your shiftnote.  Outcome: Progressing  Flowsheets (Taken 2025 0103)  Plan of Care Reviewed With:   patient   spouse  Overall Patient Progress: improving  Goal: Absence of Hospital-Acquired Illness or Injury  Intervention: Prevent Skin Injury  Recent Flowsheet Documentation  Taken 2025 by Nancy Sin, RN  Body Position: position changed independently  Taken 2025 by Nancy Sin, RN  Body Position: position changed independently  Intervention: Prevent Infection  Recent Flowsheet Documentation  Taken 2025 by Nancy Sin, RN  Infection Prevention:   hand hygiene promoted   rest/sleep promoted   single patient room provided  Taken 2025 by Nancy Sin, RN  Infection Prevention:   hand hygiene promoted   rest/sleep promoted   single patient room provided  Goal: Optimal Comfort and Wellbeing  Intervention: Monitor Pain and Promote Comfort  Recent  Flowsheet Documentation  Taken 2025 by Nancy Sin RN  Pain Management Interventions:   medication (see MAR)   heat applied  Taken 2025 by Nancy Sin RN  Pain Management Interventions: medication (see MAR)  Intervention: Provide Person-Centered Care  Recent Flowsheet Documentation  Taken 2025 by Nancy Sin RN  Trust Relationship/Rapport:   care explained   choices provided   empathic listening provided   questions answered   emotional support provided   questions encouraged   reassurance provided   thoughts/feelings acknowledged  Taken 2025 by Nancy Sin RN  Trust Relationship/Rapport:   care explained   choices provided   empathic listening provided   questions answered   emotional support provided   questions encouraged   reassurance provided   thoughts/feelings acknowledged     Problem: Postpartum ( Delivery)  Goal: Successful Parent Role Transition  Intervention: Support Parent Role Transition  Recent Flowsheet Documentation  Taken 2025 by Nancy Sin RN  Supportive Measures:   active listening utilized   decision-making supported   goal-setting facilitated   positive reinforcement provided   problem-solving facilitated   relaxation techniques promoted   self-care encouraged   verbalization of feelings encouraged  Parent-Child Attachment Promotion: (NICU Ipad setup and visiting ) other (see comments)  Taken 2025 by Nancy Sin RN  Supportive Measures:   active listening utilized   decision-making supported   goal-setting facilitated   positive reinforcement provided   problem-solving facilitated   relaxation techniques promoted   self-care encouraged   verbalization of feelings encouraged  Parent-Child Attachment Promotion: (NICU Ipad setup and visiting ) other (see comments)  Goal: Effective Bowel Elimination  Intervention: Enhance Bowel Motility and Elimination  Recent Flowsheet Documentation  Taken  4/6/2025 2355 by Nancy Sin RN  Bowel Motility Enhancement:   ambulation promoted   fluid intake encouraged  Bowel Elimination Promotion:   adequate fluid intake promoted   ambulation promoted  Taken 4/6/2025 2113 by Nancy Sin RN  Bowel Motility Enhancement:   ambulation promoted   fluid intake encouraged  Bowel Elimination Promotion:   adequate fluid intake promoted   ambulation promoted  Goal: Optimal Pain Control and Function  Intervention: Prevent or Manage Pain  Recent Flowsheet Documentation  Taken 4/6/2025 2355 by Nancy Sin RN  Perineal Care:   perineal hygiene encouraged   perineal spray bottle/warm water use encouraged  Taken 4/6/2025 2113 by Nancy Sin RN  Pain Management Interventions:   medication (see MAR)   heat applied  Perineal Care:   perineal hygiene encouraged   perineal spray bottle/warm water use encouraged  Taken 4/6/2025 1959 by Nancy Sin RN  Pain Management Interventions: medication (see MAR)  Goal: Effective Urinary Elimination  Intervention: Monitor and Manage Urinary Retention  Recent Flowsheet Documentation  Taken 4/6/2025 2355 by Nancy Sin RN  Urinary Elimination Promotion: frequent voiding encouraged  Taken 4/6/2025 2113 by Nancy Sin RN  Urinary Elimination Promotion: frequent voiding encouraged  Goal: Effective Oxygenation and Ventilation  Intervention: Optimize Oxygenation and Ventilation  Recent Flowsheet Documentation  Taken 4/6/2025 2355 by Nancy Sin RN  Head of Bed (HOB) Positioning: HOB at 45 degrees  Taken 4/6/2025 2113 by Nancy Sin RN  Head of Bed (HOB) Positioning: HOB at 45 degrees

## 2025-04-07 NOTE — LACTATION NOTE
In to follow up. Patient has decided to pay out of pocket for her hospital grade pump and use insurance for personal pump. Reminded on frequent pumping.

## 2025-04-07 NOTE — PLAN OF CARE
Vitally WDL. Fundus/lochia/incision WDL, steri-strips in place, did place some inter-dry after patient showered. Voiding appropriately and ambulating independently. Pain medications increased this morning, oxycodone is now 5-10mg q4hr as needed for moderate/severe pain. Patient also requested to have hydroxyzine ordered as she take it at home as needed for anxiety. Dr. Sanchez ordered the medication, patient did report relief in pain and anxiety after taking 25mg of hydroxyzine. Lidocaine patch was order and place on the right side of patient incision. She stated it was helpful. She showered and got the lidocaine patch wet so it was removed, new patch is due to be placed tomorrow morning at 8am. Pumping intermittently and bringing milk down to infant in NICU. S/o at bedside, supportive, also has had other family visiting. Patient has been down to the NICU via wheelchair to visit infant intermittently, also has iPad in the room to see infant in NICU.    Goal Outcome Evaluation:      Plan of Care Reviewed With: patient, spouse    Overall Patient Progress: improving

## 2025-04-07 NOTE — PROGRESS NOTES
Postpartum Progress Note  Charly Kee  [id number]    Subjective:   Patiet resting. Patient reports 8/10 abdominal and incisional pain despite POs. Denies nausea and vomiting. Ambulating with discomfort. Adequate PO intake.     Objective:  Vitals:    25 1600 25 2110 25 2355 25 0950   BP: 136/83 108/86 123/70 129/89   BP Location: Left arm Left arm Left arm Left arm   Patient Position: Sitting Semi-Ógmez's Semi-Gómez's Semi-Gómez's   Cuff Size:  Adult Regular Adult Regular Adult Regular   Pulse: 74 83 79 82   Resp: 16 16 16 17   Temp: 98.2  F (36.8  C) 98.4  F (36.9  C) 98.2  F (36.8  C) 98.3  F (36.8  C)   TempSrc: Oral Oral Oral Oral      General: resting comfortably, in NAD  Heart: regular rate, well perfused  Lungs: no increased work of breathing, no cough  Abdomen: soft, non distended, appropriately tender, FF at U -1  Incision:  c/d/I w/ steri strips in place  Extremities: +1 b/l edema, non-tender to palpation    No intake/output data recorded.    Labs:  Hemoglobin   Date Value Ref Range Status   2025 9.5 (L) 11.7 - 15.7 g/dL Final   2025 9.0 (L) 11.7 - 15.7 g/dL Final   2013 12.5 11.7 - 15.7 g/dL Final       Assessment/Plan:  25 year old  who is POD#3 s/p repeat c/s for  labor with non-reassuring fetal status.  Currently stable and doing well  - Routine post-partum cares  - Heme: stable ABLA which is clinically significant. PO iron.  - Pain: continue tylenol, ibuprofen, ice packs, hot packs as needed. Add lidocaine patch, atarax, and ensure scheduled ibuprofen and tylenol dosing.   - Opioids available as needed if the above treatments are inadequate.  - Baby: in NICU  - Dispo: d/c to home this evening or tomorrow pending improved pain control.     Savanna Suggs MD  OB/GYN

## 2025-04-08 ENCOUNTER — APPOINTMENT (OUTPATIENT)
Dept: CT IMAGING | Facility: CLINIC | Age: 25
End: 2025-04-08
Attending: OBSTETRICS & GYNECOLOGY

## 2025-04-08 LAB
ALBUMIN SERPL BCG-MCNC: 3 G/DL (ref 3.5–5.2)
ALP SERPL-CCNC: 97 U/L (ref 40–150)
ALT SERPL W P-5'-P-CCNC: 17 U/L (ref 0–50)
ANION GAP SERPL CALCULATED.3IONS-SCNC: 11 MMOL/L (ref 7–15)
AST SERPL W P-5'-P-CCNC: 22 U/L (ref 0–45)
BASE EXCESS BLDV CALC-SCNC: -3.3 MMOL/L (ref -3–3)
BILIRUB SERPL-MCNC: 0.2 MG/DL
BUN SERPL-MCNC: 8.7 MG/DL (ref 6–20)
CA-I BLD-MCNC: 4.5 MG/DL (ref 4.4–5.2)
CALCIUM SERPL-MCNC: 8.6 MG/DL (ref 8.8–10.4)
CHLORIDE SERPL-SCNC: 105 MMOL/L (ref 98–107)
CREAT SERPL-MCNC: 0.61 MG/DL (ref 0.51–0.95)
EGFRCR SERPLBLD CKD-EPI 2021: >90 ML/MIN/1.73M2
ERYTHROCYTE [DISTWIDTH] IN BLOOD BY AUTOMATED COUNT: 17.9 % (ref 10–15)
GLUCOSE BLD-MCNC: 86 MG/DL (ref 70–99)
GLUCOSE SERPL-MCNC: 71 MG/DL (ref 70–99)
HCO3 BLDV-SCNC: 21 MMOL/L (ref 21–28)
HCO3 SERPL-SCNC: 21 MMOL/L (ref 22–29)
HCT VFR BLD AUTO: 32.1 % (ref 35–47)
HGB BLD-MCNC: 9.5 G/DL (ref 11.7–15.7)
HGB BLD-MCNC: 9.8 G/DL (ref 11.7–15.7)
LACTATE BLD-SCNC: 1.7 MMOL/L (ref 0.7–2)
MCH RBC QN AUTO: 24.4 PG (ref 26.5–33)
MCHC RBC AUTO-ENTMCNC: 30.5 G/DL (ref 31.5–36.5)
MCV RBC AUTO: 80 FL (ref 78–100)
O2/TOTAL GAS SETTING VFR VENT: 21 %
OXYHGB MFR BLDV: 86 % (ref 70–75)
PCO2 BLDV: 34 MM HG (ref 40–50)
PH BLDV: 7.4 [PH] (ref 7.32–7.43)
PLATELET # BLD AUTO: 188 10E3/UL (ref 150–450)
PO2 BLDV: 57 MM HG (ref 25–47)
POTASSIUM BLD-SCNC: 3.7 MMOL/L (ref 3.4–5.3)
POTASSIUM SERPL-SCNC: 4.1 MMOL/L (ref 3.4–5.3)
PROT SERPL-MCNC: 5.9 G/DL (ref 6.4–8.3)
RBC # BLD AUTO: 4.02 10E6/UL (ref 3.8–5.2)
SAO2 % BLDV: 89 % (ref 70–75)
SODIUM BLD-SCNC: 143 MMOL/L (ref 135–145)
SODIUM SERPL-SCNC: 137 MMOL/L (ref 135–145)
WBC # BLD AUTO: 8 10E3/UL (ref 4–11)

## 2025-04-08 PROCEDURE — 36415 COLL VENOUS BLD VENIPUNCTURE: CPT | Performed by: OBSTETRICS & GYNECOLOGY

## 2025-04-08 PROCEDURE — 120N000013 HC R&B IMCU

## 2025-04-08 PROCEDURE — 85027 COMPLETE CBC AUTOMATED: CPT | Performed by: OBSTETRICS & GYNECOLOGY

## 2025-04-08 PROCEDURE — 250N000011 HC RX IP 250 OP 636: Performed by: OBSTETRICS & GYNECOLOGY

## 2025-04-08 PROCEDURE — 250N000013 HC RX MED GY IP 250 OP 250 PS 637: Performed by: OBSTETRICS & GYNECOLOGY

## 2025-04-08 PROCEDURE — 250N000009 HC RX 250: Performed by: OBSTETRICS & GYNECOLOGY

## 2025-04-08 PROCEDURE — 999N000127 HC STATISTIC PERIPHERAL IV START W US GUIDANCE

## 2025-04-08 PROCEDURE — 71275 CT ANGIOGRAPHY CHEST: CPT

## 2025-04-08 PROCEDURE — 82947 ASSAY GLUCOSE BLOOD QUANT: CPT | Performed by: OBSTETRICS & GYNECOLOGY

## 2025-04-08 PROCEDURE — 82374 ASSAY BLOOD CARBON DIOXIDE: CPT | Performed by: OBSTETRICS & GYNECOLOGY

## 2025-04-08 RX ORDER — LIDOCAINE 4 G/G
1 PATCH TOPICAL EVERY 24 HOURS
Qty: 3 PATCH | Refills: 0 | Status: SHIPPED | OUTPATIENT
Start: 2025-04-08

## 2025-04-08 RX ORDER — FUROSEMIDE 10 MG/ML
10 INJECTION INTRAMUSCULAR; INTRAVENOUS DAILY
Status: DISCONTINUED | OUTPATIENT
Start: 2025-04-08 | End: 2025-04-09

## 2025-04-08 RX ORDER — IOPAMIDOL 755 MG/ML
500 INJECTION, SOLUTION INTRAVASCULAR ONCE
Status: COMPLETED | OUTPATIENT
Start: 2025-04-08 | End: 2025-04-08

## 2025-04-08 RX ORDER — OXYCODONE HYDROCHLORIDE 5 MG/1
5-10 TABLET ORAL EVERY 4 HOURS PRN
Qty: 20 TABLET | Refills: 0 | Status: SHIPPED | OUTPATIENT
Start: 2025-04-08

## 2025-04-08 RX ADMIN — ACETAMINOPHEN 975 MG: 325 TABLET, FILM COATED ORAL at 08:07

## 2025-04-08 RX ADMIN — HYDROXYZINE HYDROCHLORIDE 25 MG: 25 TABLET, FILM COATED ORAL at 22:23

## 2025-04-08 RX ADMIN — SENNOSIDES AND DOCUSATE SODIUM 2 TABLET: 50; 8.6 TABLET ORAL at 08:06

## 2025-04-08 RX ADMIN — OXYCODONE HYDROCHLORIDE 5 MG: 5 TABLET ORAL at 14:27

## 2025-04-08 RX ADMIN — SENNOSIDES AND DOCUSATE SODIUM 1 TABLET: 50; 8.6 TABLET ORAL at 20:23

## 2025-04-08 RX ADMIN — IBUPROFEN 800 MG: 800 TABLET, FILM COATED ORAL at 22:20

## 2025-04-08 RX ADMIN — IBUPROFEN 800 MG: 800 TABLET, FILM COATED ORAL at 09:20

## 2025-04-08 RX ADMIN — SODIUM CHLORIDE 89 ML: 9 INJECTION, SOLUTION INTRAVENOUS at 11:00

## 2025-04-08 RX ADMIN — ACETAMINOPHEN 975 MG: 325 TABLET, FILM COATED ORAL at 02:31

## 2025-04-08 RX ADMIN — OXYCODONE HYDROCHLORIDE 5 MG: 5 TABLET ORAL at 22:23

## 2025-04-08 RX ADMIN — IBUPROFEN 800 MG: 800 TABLET, FILM COATED ORAL at 16:17

## 2025-04-08 RX ADMIN — FUROSEMIDE 10 MG: 10 INJECTION, SOLUTION INTRAMUSCULAR; INTRAVENOUS at 14:23

## 2025-04-08 RX ADMIN — LIDOCAINE 1 PATCH: 4 PATCH TOPICAL at 08:15

## 2025-04-08 RX ADMIN — IBUPROFEN 800 MG: 800 TABLET, FILM COATED ORAL at 04:09

## 2025-04-08 RX ADMIN — FLUOXETINE HYDROCHLORIDE 20 MG: 20 CAPSULE ORAL at 08:07

## 2025-04-08 RX ADMIN — ACETAMINOPHEN 975 MG: 325 TABLET, FILM COATED ORAL at 14:26

## 2025-04-08 RX ADMIN — IOPAMIDOL 71 ML: 755 INJECTION, SOLUTION INTRAVENOUS at 11:00

## 2025-04-08 RX ADMIN — ACETAMINOPHEN 975 MG: 325 TABLET, FILM COATED ORAL at 20:23

## 2025-04-08 ASSESSMENT — ACTIVITIES OF DAILY LIVING (ADL)
ADLS_ACUITY_SCORE: 30

## 2025-04-08 NOTE — PROVIDER NOTIFICATION
Blood Pressure elevated 135/92 denies PIH symptoms DR Christie rounding at bedside -orders for frequent blood pressures every hr for 4 hours -patient complains of chest tightness to Dr christie with assessment-chest CT ordered   Patient up and around voiding without complications rates pain at 4 and states she has relief with tylenol and ordered ibuprofen and ice pack to incision -chest sound clear and equal bilaterally   Monitor closely

## 2025-04-08 NOTE — PLAN OF CARE
"Goal Outcome Evaluation:      Plan of Care Reviewed With: patient    Overall Patient Progress: improving         Data: Vital signs and fundal checks within normal limits. Postpartum checks within normal limits - see flow record. Patient eating and drinking normally. Patient able to empty bladder independently and is up ambulating. Patient performing self cares, is able to care for infant and is pumping frequently while awake. Pt encouraged to pump every 3 hours. Incision WDL. Using tylenol, ibuprofen and oxycodone for discomfort. Pt goes down to NICU to visit frequently, pt is breastfeeding in NICU in addition to pumping.  Action: Patient medicated with ibuprofen, oxycodone and tylenol during the shift for pain. See MAR. Adequate pain control noted by patient. Patient education done, see flow record.  Response: Positive attachment behaviors observed with infant. Patient had a family member present this shift.   Plan: Continue current plan of care.  Anticipate discharge on 4/8/25 pending pain control.      Problem: Adult Inpatient Plan of Care  Goal: Plan of Care Review  Description: The Plan of Care Review/Shift note should be completed every shift.  The Outcome Evaluation is a brief statement about your assessment that the patient is improving, declining, or no change.  This information will be displayed automatically on your shiftnote.  Outcome: Progressing  Flowsheets (Taken 4/8/2025 0106)  Plan of Care Reviewed With: patient  Overall Patient Progress: improving  Goal: Patient-Specific Goal (Individualized)  Description: You can add care plan individualizations to a care plan. Examples of Individualization might be:  \"Parent requests to be called daily at 9am for status\", \"I have a hard time hearing out of my right ear\", or \"Do not touch me to wake me up as it startlesme\".  Outcome: Progressing  Goal: Absence of Hospital-Acquired Illness or Injury  Outcome: Progressing  Intervention: Prevent Skin Injury  Recent " Flowsheet Documentation  Taken 2025 by Sharon Zavaleta RN  Body Position:   position changed independently   supine, head elevated  Skin Protection: adhesive use limited  Intervention: Prevent and Manage VTE (Venous Thromboembolism) Risk  Recent Flowsheet Documentation  Taken 2025 by Sharon Zavaleta RN  VTE Prevention/Management: SCDs off (sequential compression devices)  Intervention: Prevent Infection  Recent Flowsheet Documentation  Taken 2025 by Sharon Zavaleta RN  Infection Prevention:   rest/sleep promoted   single patient room provided  Goal: Optimal Comfort and Wellbeing  Outcome: Progressing  Intervention: Monitor Pain and Promote Comfort  Recent Flowsheet Documentation  Taken 2025 by Sharon Zavaleta RN  Pain Management Interventions: medication (see MAR)  Intervention: Provide Person-Centered Care  Recent Flowsheet Documentation  Taken 2025 by Sharon Zavaleta RN  Trust Relationship/Rapport:   care explained   thoughts/feelings acknowledged  Goal: Readiness for Transition of Care  Outcome: Progressing     Problem: Postpartum ( Delivery)  Goal: Successful Parent Role Transition  Outcome: Progressing  Intervention: Support Parent Role Transition  Recent Flowsheet Documentation  Taken 2025 by Sharon Zavaleta RN  Supportive Measures: active listening utilized  Parent-Child Attachment Promotion: (iPad for NICU baby) other (see comments)  Goal: Hemostasis  Outcome: Progressing  Goal: Effective Bowel Elimination  Outcome: Progressing  Intervention: Enhance Bowel Motility and Elimination  Recent Flowsheet Documentation  Taken 2025 by Sharon Zavaleta RN  Bowel Motility Enhancement: ambulation promoted  Bowel Elimination Promotion: ambulation promoted  Goal: Fluid and Electrolyte Balance  Outcome: Progressing  Goal: Absence of Infection Signs and Symptoms  Outcome: Progressing  Goal: Anesthesia/Sedation Recovery  Outcome:  Progressing  Intervention: Optimize Anesthesia Recovery  Recent Flowsheet Documentation  Taken 4/7/2025 2100 by Sharon Zavaleta RN  Level Incentive Spirometer (mL): 750  Number of Repetitions (IS): 2  Goal: Optimal Pain Control and Function  Outcome: Progressing  Intervention: Prevent or Manage Pain  Recent Flowsheet Documentation  Taken 4/7/2025 2048 by Sharon Zavaleta RN  Pain Management Interventions: medication (see MAR)  Goal: Nausea and Vomiting Relief  Outcome: Progressing  Goal: Effective Urinary Elimination  Outcome: Progressing  Goal: Effective Oxygenation and Ventilation  Outcome: Progressing  Intervention: Optimize Oxygenation and Ventilation  Recent Flowsheet Documentation  Taken 4/7/2025 2100 by Sharon Zavaleta RN  Head of Bed (HOB) Positioning: HOB at 30-45 degrees

## 2025-04-08 NOTE — DISCHARGE SUMMARY
DISCHARGE SUMMARY    Charly Kee  2000      Admission date:  2025  Discharge date: 2025    Admission diagnosis:   - Pregnancy at 34w1d  - Baby in NICU, admitted as a transfer from [facility] after  delivery  - Acute Blood Loss Anemia - considered but ruled out    Discharge diagnosis:   - Status post  delivery  - Baby in NICU, admitted as a transfer from [facility] after  delivery  - Acute Blood Loss Anemia - considered but ruled out    Reason for Admission:   This is a 25 year old  34w1d who was admitted as a maternal SABINA from [facility] due for post-C/S care to be closer to her baby who was transferred to [facility] from [facility] as their NICU was closed.  The patient's pregnancy had been managed originally at [facility] but was SABINA to [facility] due to  labor at 33w6d. Her  labor continued despite measures to circumvent them, and due to Cat 2 tracing remote from delivery she required repeat LTCS on 2025 at 34w1d. Because the NICU at [facility] was on divert, her baby was SABINA to [facility].    Hospital Course:   At [facility]: The patient's postoperative course was remarkable for chest pain and shortness of breath. ECG showed normal sinus rhythm, her troponin was negative x2, and BNP was negative. CXR on  was concerning for pulmonary edema/atelectasis and possible cardiomegaly, but echocardiogram was wnl. Her pain ultimately improved somewhat with a GI cocktail and lasix 20mg. She desired transfer to [facility] to be with her  who was transferred there due to NICU bed availability. On transfer, her pain was controlled, her vitals were stable, she was voiding, and she was tolerating PO.     Since admit at [facility], pain control was her only issue. She did not discharge on POD 3 due to this. Increase in oxycodone to 5-10 mg every 4 hours was helpful, as well as addition of lidocaine patch.    Prior to discharge on POD 4, she had 2 mild elevations in  "diastolic blood pressure (92, 94). Hourly BPs times 4 hours showed normal SBPs and DBPs normalized without Rx (78-83). Labs WNL except Hgb 9.8 but since her Hgb is actually improved from 2025 postop this is not significant. On POD#5 she was stable for discharge home.    Discharge Exam:    Vitals:    04/ 1300 25 1647 25 0233 25 0807   BP:  125/76 131/86 (!) 139/92   BP Location:  Left arm  Left arm   Patient Position:  Semi-Gómez's  Semi-Gómez's   Cuff Size:  Adult Regular  Adult Regular   Pulse:  79 82 58   Resp:  18 17 18   Temp:  98.5  F (36.9  C) 97.8  F (36.6  C) 98.2  F (36.8  C)   TempSrc:  Oral Oral Oral   Height: 1.626 m (5' 4\")          Constitutional: healthy, alert, and no distress    Abdomen:  Uterine fundus is firm, non-tender and at the level of the umbilicus   Incision: C/D/I   LE:  No edema, non-tender      LABS:  Hemoglobin   Date Value Ref Range Status   2025 9.5 (L) 11.7 - 15.7 g/dL Final   2025 9.0 (L) 11.7 - 15.7 g/dL Final   2013 12.5 11.7 - 15.7 g/dL Final           Discharge Medications:       Review of your medicines        START taking        Dose / Directions   Lidocaine 4 % Patch  Commonly known as: LIDOCARE      Dose: 1 patch  Place 1 patch over 12 hours onto the skin every 24 hours. To prevent lidocaine toxicity, patient should be patch free for 12 hrs daily.  Quantity: 3 patch  Refills: 0     oxyCODONE 5 MG tablet  Commonly known as: ROXICODONE      Dose: 5-10 mg  Take 1-2 tablets (5-10 mg) by mouth every 4 hours as needed for severe pain or moderate pain.  Quantity: 20 tablet  Refills: 0            CONTINUE these medicines which have NOT CHANGED        Dose / Directions   acetaminophen 325 MG tablet  Commonly known as: TYLENOL  Used for: Single liveborn, born in hospital, delivered by  delivery      Dose: 650 mg  Take 2 tablets (650 mg) by mouth every 6 hours as needed for mild pain. Start after Delivery.  Quantity: 100 " tablet  Refills: 0     BENADRYL 12.5 MG/5ML elixir  Generic drug: diphenhydrAMINE      5 ml po q 6hrs prn  Quantity: 1 bottle  Refills: 1 year     CHEWABLE MULTIVITAMIN Chew      1 po daily  Quantity: 1 bottle  Refills: 1 year     ferrous sulfate 325 (65 Fe) MG tablet  Commonly known as: FEROSUL  Used for: Acute on chronic anemia      Dose: 325 mg  Take 1 tablet (325 mg) by mouth every other day.  Quantity: 30 tablet  Refills: 0     hydrocortisone 2.5 % cream  Used for: Routine infant or child health check      apply to affected area bid  Quantity: 30 g  Refills: 3     ibuprofen 600 MG tablet  Commonly known as: ADVIL/MOTRIN  Used for: Single liveborn, born in hospital, delivered by  delivery      Dose: 600 mg  Take 1 tablet (600 mg) by mouth every 6 hours as needed for moderate pain. Start after delivery  Quantity: 60 tablet  Refills: 0     Patanol 0.1 % ophthalmic solution  Used for: Routine infant or child health check  Generic drug: olopatadine      1 drop to both eyes twice a day as needed  Quantity: 1 Bottle  Refills: 3     senna-docusate 8.6-50 MG tablet  Commonly known as: SENOKOT-S/PERICOLACE  Used for: Single liveborn, born in hospital, delivered by  delivery      Dose: 1 tablet  Take 1 tablet by mouth daily. Start after delivery.  Quantity: 100 tablet  Refills: 0               Where to get your medicines        These medications were sent to [facility] [id number] [address] [address]      Phone: [phone]   Lidocaine 4 % Patch  oxyCODONE 5 MG tablet         Patient Instructions:  Activity: the patient was instructed to have pelvic rest for 6 weeks.  Nothing in the vagina. No tampons, douching, or intercourse.  No driving while on narcotics.  If delivered by , no heavy lifting, pushing, or pulling greater than 15 lbs for 6 weeks. She should notify her physician with temperature greater than 100.4 degrees,  and if she is having any brisk vaginal bleeding where she soaks through greater than 1 pad per hour for more than 2 hours, if any area on her breast becomes red or painful, or if her pain is no longer controlled by pain medications.  Diet as tolerated.  Discussed symptoms of post-partum blues and depression and urged her to contact us immediately should that become a concern.    If any history of hypertension in the hospital, please either check your blood pressure at home, or make an appointment for a follow up nurse blood pressure check in clinic within 5-7 days of discharge. If any severe headache, upper abdominal pain, or significant visual changes with headache, please call the clinic.    Follow-up with primary OB in 6 weeks for a postpartum visit.    Richy Inman MD  April 9, 2025

## 2025-04-08 NOTE — PLAN OF CARE
Vitally WDL. Fundus/lochia WDL, incision still has some steri-strips intact, also using interdry in that area. Voiding appropriately and ambulating independently. Pain adequately controlled with scheduled and PRN medications, also using ice on incision and heat on her back. Pumping intermittently and going down to the NICU to feed infant. S/o at bedside, supportive. Has been visiting NICU frequently and has iPad in her room with infant on the screen.     Goal Outcome Evaluation:      Plan of Care Reviewed With: patient and spouse    Overall Patient Progress: improving

## 2025-04-08 NOTE — LACTATION NOTE
Lactation visit; Primary RN reports Charly had CT scan done this morning and patient wondering if she can pump after contrast dye.   Encouraged to discuss with provider but reviewed resource from [facility] and [facility] (2025) with patient.   Charly states she plans to pay for hospital grade rental pump out of pocket and has rx.  Would like standard pump rx to take home- printed and given to patient.  Also reviewed when to switch to maintain mode.  States no other questions/concerns at this time.

## 2025-04-08 NOTE — PROGRESS NOTES
"  April 8, 2025    DAILY NOTE - POSTOP DAY 4    SUBJECTIVE: Feeling better in terms of pain, but notes that she \"still has heaviness\" in her chest. This was evaluated previously at [facility] prior to her transfer here, and while CXR showed signs fo pulmonary edema vs atelectasis and borderline enlarged cardiac size, subsequent EKG and echo were normal as were labs/serial troponins/BNP.    She has also had 2 MR blood pressures this morning, with diastolics in the 90s and systolics in the 130s.    She feels pain with deep inspiration, heaviness in the chest at rest. She states that she feels a little short of breath, can talk in complete sentences.      Pain controlled? Yes  Tolerating a regular diet? YES  Ambulating? YES  Voiding without difficulty? Yes  Lochia? minimal  Breastfeeding:  no pumping for baby in NICU        OBJECTIVE:  Vitals:    04/07/25 1300 04/07/25 1647 04/08/25 0233 04/08/25 0807   BP:  125/76 131/86 (!) 139/92   BP Location:  Left arm  Left arm   Patient Position:  Semi-Gómez's  Semi-Gómez's   Cuff Size:  Adult Regular  Adult Regular   Pulse:  79 82 58   Resp:  18 17 18   Temp:  98.5  F (36.9  C) 97.8  F (36.6  C) 98.2  F (36.8  C)   TempSrc:  Oral Oral Oral   Height: 1.626 m (5' 4\")          Constitutional: healthy, alert, and no distress  Abdomen:  Uterine fundus is firm, non-tender and at the level of the umbilicus   Incision: C/D/I   LE:  trace edema, non-tender  CV: regular rate and rhythm, no murmurs, rubs, or gallops  Lungs: shallow inspiration but I do not auscultate wheezes, rales, rhonchi.      LABS:  Hemoglobin   Date Value Ref Range Status   04/08/2025 9.8 (L) 11.7 - 15.7 g/dL Final   04/06/2025 9.5 (L) 11.7 - 15.7 g/dL Final   05/31/2013 12.5 11.7 - 15.7 g/dL Final     No results found for: \"RUBELLAABIGG\"   No results found for: \"ABO\"      No results found for: \"RH\"   No components found for: \"CMP\"      ASSESSMENT:   POD #4  Repeat LTCS    Chest heaviness and pain with inspiration at " times       PLAN:      Check cbc, cmp now.  Chest CT to rule out PE.  Blood pressures hourly for at least 4 hours.  Consider lasix times one if chest CT is negative for PE and shows pulmonary edema    Discharge later today versus tomorrow depending on outcome of the above. Low threshold to stay one more night at least.  Plan to discharge home with blood pressure cuff and close outpatient follow up when discharge is possible.    Idania Feliz MD

## 2025-04-09 VITALS
DIASTOLIC BLOOD PRESSURE: 88 MMHG | SYSTOLIC BLOOD PRESSURE: 121 MMHG | HEART RATE: 64 BPM | RESPIRATION RATE: 18 BRPM | HEIGHT: 64 IN | BODY MASS INDEX: 37.25 KG/M2 | TEMPERATURE: 98.2 F

## 2025-04-09 PROBLEM — Z86.59 HISTORY OF DEPRESSION: Status: ACTIVE | Noted: 2020-05-19

## 2025-04-09 PROBLEM — O09.90 SUPERVISION OF HIGH-RISK PREGNANCY: Status: RESOLVED | Noted: 2025-04-02 | Resolved: 2025-04-09

## 2025-04-09 PROCEDURE — 250N000013 HC RX MED GY IP 250 OP 250 PS 637: Performed by: OBSTETRICS & GYNECOLOGY

## 2025-04-09 RX ADMIN — SENNOSIDES AND DOCUSATE SODIUM 2 TABLET: 50; 8.6 TABLET ORAL at 08:49

## 2025-04-09 RX ADMIN — ACETAMINOPHEN 975 MG: 325 TABLET, FILM COATED ORAL at 03:41

## 2025-04-09 RX ADMIN — IBUPROFEN 800 MG: 800 TABLET, FILM COATED ORAL at 04:22

## 2025-04-09 RX ADMIN — FLUOXETINE HYDROCHLORIDE 20 MG: 20 CAPSULE ORAL at 08:49

## 2025-04-09 RX ADMIN — OXYCODONE HYDROCHLORIDE 5 MG: 5 TABLET ORAL at 03:41

## 2025-04-09 RX ADMIN — LIDOCAINE 1 PATCH: 4 PATCH TOPICAL at 08:09

## 2025-04-09 RX ADMIN — ACETAMINOPHEN 975 MG: 325 TABLET, FILM COATED ORAL at 08:49

## 2025-04-09 ASSESSMENT — ACTIVITIES OF DAILY LIVING (ADL)
ADLS_ACUITY_SCORE: 30

## 2025-04-09 NOTE — PLAN OF CARE
"Data: VSS. Postpartum checks within normal limits - see flow record. Patient is eating and drinking normally, voiding well, and ambulating independently. Patient is pumping every 2-3 hours when awake. Incision WNL, using Ice pack for discomfort.   Action: Patient medicated with Ibuprofen, Tylenol, and Oxycodone during the shift for pain and cramping. See MAR. Patient education done, see flow record.  Response: Patient visiting baby in NICU. father at bedside, supportive.  Plan: Continue current plan of care. Discharge later today.     Problem: Adult Inpatient Plan of Care  Goal: Plan of Care Review  Description: The Plan of Care Review/Shift note should be completed every shift.  The Outcome Evaluation is a brief statement about your assessment that the patient is improving, declining, or no change.  This information will be displayed automatically on your shiftnote.  Outcome: Progressing  Goal: Patient-Specific Goal (Individualized)  Description: You can add care plan individualizations to a care plan. Examples of Individualization might be:  \"Parent requests to be called daily at 9am for status\", \"I have a hard time hearing out of my right ear\", or \"Do not touch me to wake me up as it startlesme\".  Outcome: Progressing  Goal: Absence of Hospital-Acquired Illness or Injury  Outcome: Progressing  Intervention: Prevent Skin Injury  Recent Flowsheet Documentation  Taken 4/9/2025 0000 by Marcelina Seth RN  Body Position: position changed independently  Intervention: Prevent and Manage VTE (Venous Thromboembolism) Risk  Recent Flowsheet Documentation  Taken 4/9/2025 0000 by Marcelina Seth RN  VTE Prevention/Management: SCDs off (sequential compression devices)  Intervention: Prevent Infection  Recent Flowsheet Documentation  Taken 4/9/2025 0000 by Marcelina Seth RN  Infection Prevention: rest/sleep promoted  Goal: Optimal Comfort and Wellbeing  Outcome: Progressing  Intervention: Monitor Pain and Promote " Comfort  Recent Flowsheet Documentation  Taken 2025 0000 by Marcelina Seth RN  Pain Management Interventions: cold applied  Intervention: Provide Person-Centered Care  Recent Flowsheet Documentation  Taken 2025 by Marcelina Seth RN  Trust Relationship/Rapport:   care explained   choices provided   emotional support provided   empathic listening provided   questions answered   questions encouraged   reassurance provided   thoughts/feelings acknowledged  Goal: Readiness for Transition of Care  Outcome: Progressing     Problem: Postpartum ( Delivery)  Goal: Successful Parent Role Transition  Outcome: Progressing  Goal: Hemostasis  Outcome: Progressing  Goal: Effective Bowel Elimination  Outcome: Progressing  Goal: Fluid and Electrolyte Balance  Outcome: Progressing  Goal: Absence of Infection Signs and Symptoms  Outcome: Progressing  Intervention: Prevent or Manage Infection  Recent Flowsheet Documentation  Taken 2025 0000 by Marcelina Seth RN  Infection Management: aseptic technique maintained  Goal: Anesthesia/Sedation Recovery  Outcome: Progressing  Goal: Optimal Pain Control and Function  Outcome: Progressing  Intervention: Prevent or Manage Pain  Recent Flowsheet Documentation  Taken 2025 0000 by Marcelina Seth RN  Pain Management Interventions: cold applied  Goal: Nausea and Vomiting Relief  Outcome: Progressing  Goal: Effective Urinary Elimination  Outcome: Progressing  Goal: Effective Oxygenation and Ventilation  Outcome: Progressing  Intervention: Optimize Oxygenation and Ventilation  Recent Flowsheet Documentation  Taken 2025 0000 by Marcelina Seth RN  Head of Bed (HOB) Positioning: HOB at 30-45 degrees

## 2025-04-09 NOTE — PLAN OF CARE
Goal Outcome Evaluation:      Plan of Care Reviewed With: patient, spouse    Overall Patient Progress: improvingOverall Patient Progress: improving  Discharge instructions completed.  Patient states she understands all discharge instructions and all her questions have been answered.  Verbalizes when she needs to return to clinic for follow up for herself and baby.  She is caring for herself and her baby is in NICU.  Prescriptions reviewed and sent to pharmacy.  Postpartum depression symptoms reviewed and encouraged frequent review of depression scale.    Problem: Adult Inpatient Plan of Care  Goal: Plan of Care Review  Outcome: Met  Flowsheets (Taken 2025)  Plan of Care Reviewed With:   patient   spouse  Overall Patient Progress: improving  Goal: Patient-Specific Goal (Individualized)  Outcome: Met  Goal: Absence of Hospital-Acquired Illness or Injury  Outcome: Met  Intervention: Prevent Skin Injury  Recent Flowsheet Documentation  Taken 2025 by Bardai, Rozina R, RN  Body Position: position changed independently  Intervention: Prevent Infection  Recent Flowsheet Documentation  Taken 2025 by Bardai, Rozina R, RN  Infection Prevention: rest/sleep promoted  Goal: Optimal Comfort and Wellbeing  Outcome: Met  Intervention: Provide Person-Centered Care  Recent Flowsheet Documentation  Taken 2025 by Bardai, Rozina R, RN  Trust Relationship/Rapport:   care explained   questions answered   questions encouraged   reassurance provided   thoughts/feelings acknowledged  Goal: Readiness for Transition of Care  Outcome: Met     Problem: Postpartum ( Delivery)  Goal: Successful Parent Role Transition  Outcome: Met  Goal: Hemostasis  Outcome: Met  Goal: Effective Bowel Elimination  Outcome: Met  Goal: Fluid and Electrolyte Balance  Outcome: Met  Goal: Absence of Infection Signs and Symptoms  Outcome: Met  Goal: Anesthesia/Sedation Recovery  Outcome: Met  Goal: Optimal Pain Control and  Function  Outcome: Met  Goal: Nausea and Vomiting Relief  Outcome: Met  Goal: Effective Urinary Elimination  Outcome: Met  Goal: Effective Oxygenation and Ventilation  Outcome: Met

## 2025-04-09 NOTE — DISCHARGE INSTRUCTIONS
Warning Signs after Having a Baby    Keep this paper on your fridge or somewhere else where you can see it.    Call your provider if you have any of these symptoms up to 12 weeks after having your baby.    Thoughts of hurting yourself or your baby  Pain in your chest or trouble breathing  Severe headache not helped by pain medicine  Eyesight concerns (blurry vision, seeing spots or flashes of light, other changes to eyesight)  Fainting, shaking or other signs of a seizure    Call 9-1-1 if you feel that it is an emergency.     The symptoms below can happen to anyone after giving birth. They can be very serious. Call your provider if you have any of these warning signs.    My provider s phone number: _______________________    Losing too much blood (hemorrhage)    Call your provider if you soak through a pad in less than an hour or pass blood clots bigger than a golf ball. These may be signs that you are bleeding too much.    Blood clots in the legs or lungs    After you give birth, your body naturally clots its blood to help prevent blood loss. Sometimes this increased clotting can happen in other areas of the body, like the legs or lungs. This can block your blood flow and be very dangerous.     Call your provider if you:  Have a red, swollen spot on the back of your leg that is warm or painful when you touch it.   Are coughing up blood.     Infection    Call your provider if you have any of these symptoms:  Fever of 100.4 F (38 C) or higher.  Pain or redness around your stitches if you had an incision.   Any yellow, white, or green fluid coming from places where you had stitches or surgery.    Mood Problems (postpartum depression)    Many people feel sad or have mood changes after having a baby. But for some people, these mood swings are worse.     Call your provider right away if you feel so anxious or nervous that you can't care for yourself or your baby.    Preeclampsia (high blood pressure)    Even if you  didn't have high blood pressure when you were pregnant, you are at risk for the high blood pressure disease called preeclampsia. This risk can last up to 12 weeks after giving birth.     Call your provider if you have:   Pain on your right side under your rib cage  Sudden swelling in the hands and face    Remember: You know your body. If something doesn't feel right, get medical help.     For informational purposes only. Not to replace the advice of your health care provider. Copyright 2020 [facility]. All rights reserved. Clinically reviewed by Sachi Turner, RNC-OB, MSN. [employer] 550864 - Rev .   Section: What to Expect at Home  Your Recovery     A  section, or , is surgery to deliver your baby through a cut that the doctor makes in your lower belly and uterus. The cut is called an incision.  You may have some pain in your lower belly and need pain medicine for 1 to 2 weeks. You can expect some vaginal bleeding for several weeks. You will probably need about 6 weeks to fully recover.  It's important to take it easy while the incision heals. Avoid heavy lifting, strenuous activities, and exercises that strain the belly muscles while you recover. Ask a family member or friend for help with housework, cooking, and shopping.  This care sheet gives you a general idea about how long it will take for you to recover. But each person recovers at a different pace. Follow the steps below to get better as quickly as possible.  How can you care for yourself at home?  Activity       Rest when you feel tired. Getting enough sleep will help you recover.        Try to walk each day. Start by walking a little more than you did the day before. Bit by bit, increase the amount you walk. Walking boosts blood flow and helps prevent pneumonia, constipation, and blood clots.        Avoid strenuous activities, such as bicycle riding, jogging, weightlifting, and aerobic exercise, for 6 weeks  or until your doctor says it is okay.        Until your doctor says it is okay, do not lift anything heavier than your baby.        Do not do sit-ups or other exercises that strain the belly muscles for 6 weeks or until your doctor says it is okay.        Hold a pillow over your incision when you cough or take deep breaths. This will support your belly and decrease your pain.        You may shower as usual. Pat the incision dry when you are done.        You will have some vaginal bleeding. Wear sanitary pads. Do not douche or use tampons until your doctor says it is okay.        Ask your doctor when you can drive again.        You will probably need to take at least 6 weeks off work. It depends on the type of work you do and how you feel.        Ask your doctor when it is okay for you to have sex.   Diet       You can eat your normal diet. If your stomach is upset, try bland, low-fat foods like plain rice, broiled chicken, toast, and yogurt.        Drink plenty of fluids (unless your doctor tells you not to).        You may notice that your bowel movements are not regular right after your surgery. This is common. Try to avoid constipation and straining with bowel movements. You may want to take a fiber supplement every day. If you have not had a bowel movement after a couple of days, ask your doctor about taking a mild laxative.        If you are breastfeeding, limit alcohol. Alcohol can cause a lack of energy and other health problems for the baby when a breastfeeding woman drinks heavily. It can also get in the way of a mom's ability to feed her baby or to care for the child in other ways. There isn't a lot of research about exactly how much alcohol can harm a baby. Having no alcohol is the safest choice for your baby. If you choose to have a drink now and then, have only one drink, and limit the number of occasions that you have a drink. Wait to breastfeed at least 2 hours after you have a drink to reduce the  amount of alcohol the baby may get in the milk.   Medicines       Your doctor will tell you if and when you can restart your medicines. You will also get instructions about taking any new medicines.        If you stopped taking aspirin or some other blood thinner, your doctor will tell you when to start taking it again.        Take pain medicines exactly as directed.  If the doctor gave you a prescription medicine for pain, take it as prescribed.  If you are not taking a prescription pain medicine, ask your doctor if you can take an over-the-counter medicine.        If you think your pain medicine is making you sick to your stomach:  Take your medicine after meals (unless your doctor has told you not to).  Ask your doctor for a different pain medicine.        If your doctor prescribed antibiotics, take them as directed. Do not stop taking them just because you feel better. You need to take the full course of antibiotics.   Incision care       If you have strips of tape on the incision, leave the tape on for a week or until it falls off.        Wash the area daily with warm, soapy water, and pat it dry. Don't use hydrogen peroxide or alcohol, which can slow healing. You may cover the area with a gauze bandage if it weeps or rubs against clothing. Change the bandage every day.        Keep the area clean and dry.   Other instructions       If you breastfeed your baby, you may be more comfortable while you are healing if you don't rest your baby on your belly. Try tucking your baby under your arm, with your baby's body along the side you will be feeding on. Support your baby's upper body with your arm. With that hand you can control your baby's head to bring your baby's mouth to your breast. This is sometimes called the football hold.   Follow-up care is a key part of your treatment and safety. Be sure to make and go to all appointments, and call your doctor if you are having problems. It's also a good idea to know your  test results and keep a list of the medicines you take.  When should you call for help?  Share this information with your partner, family, or a friend. They can help you watch for warning signs.  Call 911  anytime you think you may need emergency care. For example, call if:       You feel you cannot stop from hurting yourself, your baby, or someone else.        You passed out (lost consciousness).        You have chest pain, are short of breath, or cough up blood.        You have a seizure.   Where to get help 24 hours a day, 7 days a week   If you or someone you know talks about suicide, self-harm, a mental health crisis, a substance use crisis, or any other kind of emotional distress, get help right away. You can:       Call the Suicide and Crisis Lifeline at 988.        Call 6-432-488-TALK (1-690.763.6536).        Text HOME to 278306 to access the Crisis Text Line.   Consider saving these numbers in your phone.  Go to [email or web address] for more information or to chat online.  Call your doctor or midwife now or seek immediate medical care if:       You have loose stitches, or your incision comes open.        You have signs of hemorrhage (too much bleeding), such as:  Heavy vaginal bleeding. This means that you are soaking through one or more pads in an hour. Or you pass blood clots bigger than an egg.  Feeling dizzy or lightheaded, or you feel like you may faint.  Feeling so tired or weak that you cannot do your usual activities.  A fast or irregular heartbeat.  New or worse belly pain.        You have symptoms of infection, such as:  Increased pain, swelling, warmth, or redness.  Red streaks leading from the incision.  Pus draining from the incision.  A fever.  Frequent or painful urination or blood in your urine.  Vaginal discharge that smells bad.  New or worse belly pain.        You have symptoms of a blood clot in your leg (called a deep vein thrombosis), such as:  Pain in the calf, back of the knee, thigh, or  "groin.  Swelling in the leg or groin.  A color change on the leg or groin. The skin may be reddish or purplish, depending on your usual skin color.        You have signs of preeclampsia, such as:  Sudden swelling of your face, hands, or feet.  New vision problems (such as dimness, blurring, or seeing spots).  A severe headache.        You have signs of heart failure, such as:  New or increased shortness of breath.  New or worse swelling in your legs, ankles, or feet.  Sudden weight gain, such as more than 2 to 3 pounds in a day or 5 pounds in a week.  Feeling so tired or weak that you cannot do your usual activities.        You had spinal or epidural pain relief and have:  New or worse back pain.  Increased pain, swelling, warmth, or redness at the injection site.  Tingling, weakness, or numbness in your legs or groin.   Watch closely for changes in your health, and be sure to contact your doctor or midwife if:       Your vaginal bleeding isn't decreasing.        You feel sad, anxious, or hopeless for more than a few days.        You are having problems with your breasts or breastfeeding.   Where can you learn more?  Go to https://www.[employer].net/patiented  Enter M806 in the search box to learn more about \" Section: What to Expect at Home.\"  Current as of: 2024  Content Version: 14.4    [id number] [employer].   Care instructions adapted under license by your healthcare professional. If you have questions about a medical condition or this instruction, always ask your healthcare professional. [employer] disclaims any warranty or liability for your use of this information.  Depression After Childbirth: Care Instructions  It's common to lose sleep, feel irritable, and cry easily during the first few days after childbirth. Hormone changes and the demands of a new baby can cause these \"baby blues.\" If these mood changes last more than 2 weeks, you may have postpartum depression. " This is a medical condition that requires treatment.  If you have any of these signs, you may be depressed. See your doctor right away.    You feel very sad or hopeless and lose interest in daily activities.       You sleep too much or not enough.       You feel tired or as if you have no energy.       You eat too much or too little.       You write or talk about death.     Tips to help with postpartum depression        What you can do   Try to go to all of your counseling sessions.  Take medicines as directed.  Eat healthy foods.  Get daily exercise, such as walks.  Try to get some sunlight every day.  Avoid using alcohol or other substances.  Get as much rest as possible.  Connect with friends, and join a support group for new parents.  When should you call for help?   Call 337 if:    You feel you cannot stop from hurting yourself, your baby, or someone else.   Where to get help 24 hours a day, 7 days a week   If you or someone you know talks about suicide, self-harm, a mental health crisis, a substance use crisis, or any other kind of emotional distress, get help right away. You can:    Call the Suicide and Crisis Lifeline at 923.     Call 1-346-950-TALK (1-522.557.1999).     Text HOME to 059725 to access the Crisis Text Line.   Consider saving these numbers in your phone.  Go to [employer] for more information or to chat online.  Call your doctor now or seek immediate medical care if:    You are having trouble caring for yourself or your baby.     You hear voices.   Contact your doctor if:    You have problems with your medicines.     You do not get better as expected.   Follow-up care is a key part of your treatment and safety. Be sure to make and go to all appointments, and call your doctor if you are having problems. It's also a good idea to know your test results and keep a list of the medicines you take.  Where can you learn more?  Go to https://www.healthwise.net/patiented  Enter Y765 in the search  "box to learn more about \"Depression After Childbirth: Care Instructions.\"  Current as of: July 31, 2024  Content Version: 14.4 2024-2025 [employer].   Care instructions adapted under license by your healthcare professional. If you have questions about a medical condition or this instruction, always ask your healthcare professional. [employer] disclaims any warranty or liability for your use of this information.  "

## 2025-04-15 NOTE — ANESTHESIA POSTPROCEDURE EVALUATION
Patient: Charly Kee    Procedure: Procedure(s):   section       Anesthesia Type:  Epidural, Spinal, General    Note:  Disposition: Admission   Postop Pain Control: Uneventful            Sign Out: Well controlled pain   PONV: No   Neuro/Psych: Uneventful            Sign Out: Acceptable/Baseline neuro status   Airway/Respiratory: Uneventful            Sign Out: Acceptable/Baseline resp. status   CV/Hemodynamics: Uneventful            Sign Out: Acceptable CV status; No obvious hypovolemia; No obvious fluid overload   Other NRE: NONE   DID A NON-ROUTINE EVENT OCCUR? No           Last vitals:  Vitals Value Taken Time   /66 25 1615   Temp 36.6  C (97.9  F) 25 1600   Pulse 66 25 1615   Resp 26 25 1615   SpO2 99 % 25 1615       Electronically Signed By: Najma Quijano MD  April 15, 2025  4:01 PM

## 2025-07-30 LAB
PATH REPORT.COMMENTS IMP SPEC: NORMAL
PATH REPORT.COMMENTS IMP SPEC: NORMAL
PATH REPORT.FINAL DX SPEC: NORMAL
PATH REPORT.GROSS SPEC: NORMAL
PATH REPORT.MICROSCOPIC SPEC OTHER STN: NORMAL
PATH REPORT.RELEVANT HX SPEC: NORMAL
PHOTO IMAGE: NORMAL

## (undated) DEVICE — SOL NACL 0.9% IRRIG 1000ML BOTTLE 07138-09

## (undated) DEVICE — SU PDS II 0 CTX 60" Z990G

## (undated) DEVICE — GLOVE ESTEEM POWDER FREE SMT 6.5  2D72PT65

## (undated) DEVICE — SU VICRYL 0 CT-1 36" J346H

## (undated) DEVICE — SOL WATER IRRIG 1000ML BOTTLE 07139-09

## (undated) DEVICE — PREP CHLORAPREP 26ML TINTED ORANGE  260815

## (undated) DEVICE — PACK C-SECTION LF PL15OTA83B

## (undated) DEVICE — GLOVE PROTEXIS BLUE W/NEU-THERA 7.0  2D73EB70

## (undated) DEVICE — ESU PENCIL W/SMOKE EVAC NEPTUNE STRYKER 0703-046-000

## (undated) DEVICE — STRAP KNEE/BODY 31143004

## (undated) DEVICE — STOCKING SLEEVE COMPRESSION CALF LG

## (undated) DEVICE — SU PLAIN 3-0 CTX 27" 873H

## (undated) DEVICE — SU MONOCRYL 4-0 PS-2 18" UND Y496G

## (undated) DEVICE — CATH TRAY FOLEY SURESTEP 16FR WDRAIN BAG STLK LATEX A300316A

## (undated) RX ORDER — MORPHINE SULFATE 1 MG/ML
INJECTION, SOLUTION EPIDURAL; INTRATHECAL; INTRAVENOUS
Status: DISPENSED
Start: 2025-04-04

## (undated) RX ORDER — ACETAMINOPHEN 325 MG/1
TABLET ORAL
Status: DISPENSED
Start: 2025-04-04

## (undated) RX ORDER — TRANEXAMIC ACID 10 MG/ML
INJECTION, SOLUTION INTRAVENOUS
Status: DISPENSED
Start: 2025-04-04

## (undated) RX ORDER — FENTANYL CITRATE 50 UG/ML
INJECTION, SOLUTION INTRAMUSCULAR; INTRAVENOUS
Status: DISPENSED
Start: 2025-04-04